# Patient Record
Sex: MALE | Race: WHITE | Employment: FULL TIME | URBAN - METROPOLITAN AREA
[De-identification: names, ages, dates, MRNs, and addresses within clinical notes are randomized per-mention and may not be internally consistent; named-entity substitution may affect disease eponyms.]

---

## 2018-07-29 ENCOUNTER — HOSPITAL ENCOUNTER (INPATIENT)
Age: 43
LOS: 5 days | Discharge: HOME | DRG: 101 | End: 2018-08-03
Attending: EMERGENCY MEDICINE
Payer: COMMERCIAL

## 2018-07-29 ENCOUNTER — APPOINTMENT (OUTPATIENT)
Dept: CT IMAGING | Age: 43
DRG: 101 | End: 2018-07-29
Payer: COMMERCIAL

## 2018-07-29 ENCOUNTER — APPOINTMENT (OUTPATIENT)
Dept: GENERAL RADIOLOGY | Age: 43
DRG: 101 | End: 2018-07-29
Payer: COMMERCIAL

## 2018-07-29 DIAGNOSIS — E87.6 HYPOKALEMIA: ICD-10-CM

## 2018-07-29 DIAGNOSIS — J01.00 ACUTE MAXILLARY SINUSITIS, RECURRENCE NOT SPECIFIED: ICD-10-CM

## 2018-07-29 DIAGNOSIS — D72.829 LEUKOCYTOSIS, UNSPECIFIED TYPE: ICD-10-CM

## 2018-07-29 DIAGNOSIS — R41.82 ALTERED MENTAL STATUS, UNSPECIFIED ALTERED MENTAL STATUS TYPE: Primary | ICD-10-CM

## 2018-07-29 DIAGNOSIS — R74.8 ELEVATED CPK: ICD-10-CM

## 2018-07-29 DIAGNOSIS — R56.9 SEIZURE (HCC): ICD-10-CM

## 2018-07-29 DIAGNOSIS — R52 PAIN: ICD-10-CM

## 2018-07-29 LAB
A/G RATIO: 1.3 (ref 1.1–2.2)
ALBUMIN SERPL-MCNC: 3.9 G/DL (ref 3.4–5)
ALBUMIN SERPL-MCNC: 3.9 G/DL (ref 3.4–5)
ALP BLD-CCNC: 109 U/L (ref 40–129)
ALP BLD-CCNC: 110 U/L (ref 40–129)
ALT SERPL-CCNC: 60 U/L (ref 10–40)
ALT SERPL-CCNC: 60 U/L (ref 10–40)
AMMONIA: 33 UMOL/L (ref 16–60)
AMORPHOUS: ABNORMAL /HPF
AMPHETAMINE SCREEN, URINE: NORMAL
ANION GAP SERPL CALCULATED.3IONS-SCNC: 12 MMOL/L (ref 3–16)
AST SERPL-CCNC: 164 U/L (ref 15–37)
AST SERPL-CCNC: 164 U/L (ref 15–37)
BARBITURATE SCREEN URINE: NORMAL
BASE EXCESS ARTERIAL: -1.3 MMOL/L (ref -3–3)
BASOPHILS ABSOLUTE: 0.2 K/UL (ref 0–0.2)
BASOPHILS RELATIVE PERCENT: 0.6 %
BENZODIAZEPINE SCREEN, URINE: NORMAL
BILIRUB SERPL-MCNC: 0.3 MG/DL (ref 0–1)
BILIRUB SERPL-MCNC: 0.3 MG/DL (ref 0–1)
BILIRUBIN DIRECT: <0.2 MG/DL (ref 0–0.3)
BILIRUBIN URINE: NEGATIVE
BILIRUBIN, INDIRECT: ABNORMAL MG/DL (ref 0–1)
BLOOD, URINE: ABNORMAL
BUN BLDV-MCNC: 19 MG/DL (ref 7–20)
CALCIUM IONIZED: 1.24 MMOL/L (ref 1.12–1.32)
CALCIUM SERPL-MCNC: 8.6 MG/DL (ref 8.3–10.6)
CANNABINOID SCREEN URINE: NORMAL
CARBOXYHEMOGLOBIN ARTERIAL: 3.1 % (ref 0–1.5)
CHLORIDE BLD-SCNC: 109 MMOL/L (ref 99–110)
CLARITY: ABNORMAL
CO2: 23 MMOL/L (ref 21–32)
CO2: 24 MMOL/L (ref 21–32)
COCAINE METABOLITE SCREEN URINE: NORMAL
COLOR: YELLOW
CREAT SERPL-MCNC: 1.1 MG/DL (ref 0.9–1.3)
CRYSTALS, UA: ABNORMAL /HPF
EOSINOPHILS ABSOLUTE: 0 K/UL (ref 0–0.6)
EOSINOPHILS RELATIVE PERCENT: 0 %
GFR AFRICAN AMERICAN: >60
GFR AFRICAN AMERICAN: >60
GFR NON-AFRICAN AMERICAN: >60
GFR NON-AFRICAN AMERICAN: >60
GLOBULIN: 3.1 G/DL
GLUCOSE BLD-MCNC: 104 MG/DL (ref 70–99)
GLUCOSE BLD-MCNC: 95 MG/DL (ref 70–99)
GLUCOSE URINE: NEGATIVE MG/DL
HCO3 ARTERIAL: 23 MMOL/L (ref 21–29)
HCT VFR BLD CALC: 46.1 % (ref 40.5–52.5)
HEMOGLOBIN, ART, EXTENDED: 14.6 G/DL
HEMOGLOBIN: 15.5 G/DL (ref 13.5–17.5)
KETONES, URINE: NEGATIVE MG/DL
LACTIC ACID: 1.4 MMOL/L (ref 0.4–2)
LEUKOCYTE ESTERASE, URINE: NEGATIVE
LYMPHOCYTES ABSOLUTE: 0.5 K/UL (ref 1–5.1)
LYMPHOCYTES RELATIVE PERCENT: 1.6 %
Lab: NORMAL
MCH RBC QN AUTO: 31.2 PG (ref 26–34)
MCHC RBC AUTO-ENTMCNC: 33.6 G/DL (ref 31–36)
MCV RBC AUTO: 92.9 FL (ref 80–100)
METHADONE SCREEN, URINE: NORMAL
METHEMOGLOBIN ARTERIAL: 0.8 % (ref 0–1.4)
MICROSCOPIC EXAMINATION: YES
MONOCYTES ABSOLUTE: 2.3 K/UL (ref 0–1.3)
MONOCYTES RELATIVE PERCENT: 8.1 %
NEUTROPHILS ABSOLUTE: 25.5 K/UL (ref 1.7–7.7)
NEUTROPHILS RELATIVE PERCENT: 89.7 %
NITRITE, URINE: NEGATIVE
O2 SAT, ARTERIAL: 98 % (ref 93–100)
OPIATE SCREEN URINE: NORMAL
OXYCODONE URINE: NORMAL
PCO2 ARTERIAL: 39.7 MMHG (ref 35–45)
PDW BLD-RTO: 15.5 % (ref 12.4–15.4)
PERFORMED ON: ABNORMAL
PH ARTERIAL: 7.38 (ref 7.35–7.45)
PH UA: 5.5
PH UA: 5.5
PHENCYCLIDINE SCREEN URINE: NORMAL
PHENYTOIN DOSE AMOUNT: ABNORMAL
PHENYTOIN LEVEL: 3.9 UG/ML (ref 10–20)
PLATELET # BLD: 264 K/UL (ref 135–450)
PMV BLD AUTO: 7.4 FL (ref 5–10.5)
PO2 ARTERIAL: 102 MMHG (ref 75–108)
POC ANION GAP: 15 (ref 10–20)
POC BUN: 22 MG/DL (ref 7–18)
POC CHLORIDE: 106 MMOL/L (ref 99–110)
POC CREATININE: 1.3 MG/DL (ref 0.9–1.3)
POC POTASSIUM: 3.1 MMOL/L (ref 3.5–5.1)
POC SAMPLE TYPE: ABNORMAL
POC SODIUM: 144 MMOL/L (ref 136–145)
POTASSIUM REFLEX MAGNESIUM: 4.6 MMOL/L (ref 3.5–5.1)
PROPOXYPHENE SCREEN: NORMAL
PROTEIN UA: 100 MG/DL
RBC # BLD: 4.96 M/UL (ref 4.2–5.9)
RBC UA: ABNORMAL /HPF (ref 0–2)
SODIUM BLD-SCNC: 145 MMOL/L (ref 136–145)
SPECIFIC GRAVITY UA: >=1.03
T4 TOTAL: 7.1 UG/DL (ref 4.5–10.9)
TCO2 ARTERIAL: 24 MMOL/L
TOTAL CK: 4420 U/L (ref 39–308)
TOTAL PROTEIN: 7 G/DL (ref 6.4–8.2)
TOTAL PROTEIN: 7 G/DL (ref 6.4–8.2)
TSH SERPL DL<=0.05 MIU/L-ACNC: 2.02 UIU/ML (ref 0.27–4.2)
URINE TYPE: ABNORMAL
UROBILINOGEN, URINE: 0.2 E.U./DL
WBC # BLD: 28.5 K/UL (ref 4–11)
WBC UA: ABNORMAL /HPF (ref 0–5)

## 2018-07-29 PROCEDURE — 70450 CT HEAD/BRAIN W/O DYE: CPT

## 2018-07-29 PROCEDURE — 6360000002 HC RX W HCPCS: Performed by: EMERGENCY MEDICINE

## 2018-07-29 PROCEDURE — 80307 DRUG TEST PRSMV CHEM ANLYZR: CPT

## 2018-07-29 PROCEDURE — 80047 BASIC METABLC PNL IONIZED CA: CPT

## 2018-07-29 PROCEDURE — 82550 ASSAY OF CK (CPK): CPT

## 2018-07-29 PROCEDURE — 72100 X-RAY EXAM L-S SPINE 2/3 VWS: CPT

## 2018-07-29 PROCEDURE — 2580000003 HC RX 258: Performed by: STUDENT IN AN ORGANIZED HEALTH CARE EDUCATION/TRAINING PROGRAM

## 2018-07-29 PROCEDURE — 83605 ASSAY OF LACTIC ACID: CPT

## 2018-07-29 PROCEDURE — 82803 BLOOD GASES ANY COMBINATION: CPT

## 2018-07-29 PROCEDURE — 94664 DEMO&/EVAL PT USE INHALER: CPT

## 2018-07-29 PROCEDURE — 2580000003 HC RX 258: Performed by: EMERGENCY MEDICINE

## 2018-07-29 PROCEDURE — 80053 COMPREHEN METABOLIC PANEL: CPT

## 2018-07-29 PROCEDURE — 93005 ELECTROCARDIOGRAM TRACING: CPT | Performed by: EMERGENCY MEDICINE

## 2018-07-29 PROCEDURE — 71045 X-RAY EXAM CHEST 1 VIEW: CPT

## 2018-07-29 PROCEDURE — 90715 TDAP VACCINE 7 YRS/> IM: CPT | Performed by: EMERGENCY MEDICINE

## 2018-07-29 PROCEDURE — 85025 COMPLETE CBC W/AUTO DIFF WBC: CPT

## 2018-07-29 PROCEDURE — 51702 INSERT TEMP BLADDER CATH: CPT

## 2018-07-29 PROCEDURE — 96365 THER/PROPH/DIAG IV INF INIT: CPT

## 2018-07-29 PROCEDURE — 80185 ASSAY OF PHENYTOIN TOTAL: CPT

## 2018-07-29 PROCEDURE — 87040 BLOOD CULTURE FOR BACTERIA: CPT

## 2018-07-29 PROCEDURE — 82140 ASSAY OF AMMONIA: CPT

## 2018-07-29 PROCEDURE — 6360000002 HC RX W HCPCS: Performed by: STUDENT IN AN ORGANIZED HEALTH CARE EDUCATION/TRAINING PROGRAM

## 2018-07-29 PROCEDURE — 90471 IMMUNIZATION ADMIN: CPT | Performed by: EMERGENCY MEDICINE

## 2018-07-29 PROCEDURE — 84443 ASSAY THYROID STIM HORMONE: CPT

## 2018-07-29 PROCEDURE — 81001 URINALYSIS AUTO W/SCOPE: CPT

## 2018-07-29 PROCEDURE — 36415 COLL VENOUS BLD VENIPUNCTURE: CPT

## 2018-07-29 PROCEDURE — 84436 ASSAY OF TOTAL THYROXINE: CPT

## 2018-07-29 PROCEDURE — 72125 CT NECK SPINE W/O DYE: CPT

## 2018-07-29 PROCEDURE — 1200000000 HC SEMI PRIVATE

## 2018-07-29 PROCEDURE — 6370000000 HC RX 637 (ALT 250 FOR IP): Performed by: STUDENT IN AN ORGANIZED HEALTH CARE EDUCATION/TRAINING PROGRAM

## 2018-07-29 PROCEDURE — 36600 WITHDRAWAL OF ARTERIAL BLOOD: CPT

## 2018-07-29 PROCEDURE — 99291 CRITICAL CARE FIRST HOUR: CPT

## 2018-07-29 RX ORDER — HEPARIN SODIUM 5000 [USP'U]/ML
5000 INJECTION, SOLUTION INTRAVENOUS; SUBCUTANEOUS EVERY 8 HOURS SCHEDULED
Status: DISCONTINUED | OUTPATIENT
Start: 2018-07-29 | End: 2018-08-03 | Stop reason: HOSPADM

## 2018-07-29 RX ORDER — PHENYTOIN SODIUM 100 MG/1
600 CAPSULE, EXTENDED RELEASE ORAL DAILY
Status: ON HOLD | COMMUNITY
End: 2018-08-03

## 2018-07-29 RX ORDER — POTASSIUM CHLORIDE 7.45 MG/ML
10 INJECTION INTRAVENOUS ONCE
Status: COMPLETED | OUTPATIENT
Start: 2018-07-29 | End: 2018-07-29

## 2018-07-29 RX ORDER — SODIUM CHLORIDE 0.9 % (FLUSH) 0.9 %
10 SYRINGE (ML) INJECTION EVERY 12 HOURS SCHEDULED
Status: DISCONTINUED | OUTPATIENT
Start: 2018-07-29 | End: 2018-08-03 | Stop reason: HOSPADM

## 2018-07-29 RX ORDER — NICOTINE 21 MG/24HR
1 PATCH, TRANSDERMAL 24 HOURS TRANSDERMAL DAILY
Status: DISCONTINUED | OUTPATIENT
Start: 2018-07-29 | End: 2018-07-30

## 2018-07-29 RX ORDER — FOSPHENYTOIN SODIUM 50 MG/ML
200 INJECTION, SOLUTION INTRAMUSCULAR; INTRAVENOUS EVERY 8 HOURS
Status: DISCONTINUED | OUTPATIENT
Start: 2018-07-29 | End: 2018-07-30

## 2018-07-29 RX ORDER — PHENYTOIN SODIUM 100 MG/1
600 CAPSULE, EXTENDED RELEASE ORAL DAILY
Status: DISCONTINUED | OUTPATIENT
Start: 2018-07-29 | End: 2018-07-29

## 2018-07-29 RX ORDER — ONDANSETRON 2 MG/ML
4 INJECTION INTRAMUSCULAR; INTRAVENOUS EVERY 6 HOURS PRN
Status: DISCONTINUED | OUTPATIENT
Start: 2018-07-29 | End: 2018-08-03 | Stop reason: HOSPADM

## 2018-07-29 RX ORDER — POTASSIUM CHLORIDE 1.5 G/1.77G
40 POWDER, FOR SOLUTION ORAL ONCE
Status: DISCONTINUED | OUTPATIENT
Start: 2018-07-29 | End: 2018-07-29

## 2018-07-29 RX ORDER — SODIUM CHLORIDE 0.9 % (FLUSH) 0.9 %
10 SYRINGE (ML) INJECTION PRN
Status: DISCONTINUED | OUTPATIENT
Start: 2018-07-29 | End: 2018-08-03 | Stop reason: HOSPADM

## 2018-07-29 RX ORDER — PHENYTOIN 50 MG/1
100 TABLET, CHEWABLE ORAL 3 TIMES DAILY
COMMUNITY
End: 2018-07-29 | Stop reason: CLARIF

## 2018-07-29 RX ORDER — SODIUM CHLORIDE 9 MG/ML
INJECTION, SOLUTION INTRAVENOUS CONTINUOUS
Status: DISCONTINUED | OUTPATIENT
Start: 2018-07-29 | End: 2018-08-01

## 2018-07-29 RX ORDER — PHENYTOIN SODIUM 50 MG/ML
200 INJECTION, SOLUTION INTRAMUSCULAR; INTRAVENOUS EVERY 8 HOURS
Status: DISCONTINUED | OUTPATIENT
Start: 2018-07-29 | End: 2018-07-29

## 2018-07-29 RX ORDER — SODIUM CHLORIDE, SODIUM LACTATE, POTASSIUM CHLORIDE, AND CALCIUM CHLORIDE .6; .31; .03; .02 G/100ML; G/100ML; G/100ML; G/100ML
1000 INJECTION, SOLUTION INTRAVENOUS ONCE
Status: COMPLETED | OUTPATIENT
Start: 2018-07-29 | End: 2018-07-29

## 2018-07-29 RX ADMIN — HEPARIN SODIUM 5000 UNITS: 5000 INJECTION INTRAVENOUS; SUBCUTANEOUS at 15:03

## 2018-07-29 RX ADMIN — FOSPHENYTOIN SODIUM 200 MG PE: 50 INJECTION, SOLUTION INTRAMUSCULAR; INTRAVENOUS at 18:50

## 2018-07-29 RX ADMIN — SODIUM CHLORIDE, POTASSIUM CHLORIDE, SODIUM LACTATE AND CALCIUM CHLORIDE 1000 ML: 600; 310; 30; 20 INJECTION, SOLUTION INTRAVENOUS at 10:34

## 2018-07-29 RX ADMIN — SODIUM CHLORIDE: 9 INJECTION, SOLUTION INTRAVENOUS at 21:07

## 2018-07-29 RX ADMIN — POTASSIUM CHLORIDE 10 MEQ: 10 INJECTION, SOLUTION INTRAVENOUS at 09:53

## 2018-07-29 RX ADMIN — TETANUS TOXOID, REDUCED DIPHTHERIA TOXOID AND ACELLULAR PERTUSSIS VACCINE, ADSORBED 0.5 ML: 5; 2.5; 8; 8; 2.5 SUSPENSION INTRAMUSCULAR at 09:34

## 2018-07-29 RX ADMIN — DEXTROSE MONOHYDRATE 750 MG PE: 50 INJECTION, SOLUTION INTRAVENOUS at 10:24

## 2018-07-29 RX ADMIN — SODIUM CHLORIDE: 9 INJECTION, SOLUTION INTRAVENOUS at 15:02

## 2018-07-29 ASSESSMENT — PAIN SCALES - GENERAL
PAINLEVEL_OUTOF10: 0

## 2018-07-29 NOTE — PROGRESS NOTES
4 Eyes Admission Assessment     I agree as the admission nurse that 2 RN's have performed a thorough Head to Toe Skin Assessment on the patient. ALL assessment sites listed below have been assessed on admission. Areas assessed by both nurses:   [x]   Head, Face, and Ears   [x]   Shoulders, Back, and Chest  [x]   Arms, Elbows, and Hands   [x]   Coccyx, Sacrum, and Ischum  [x]   Legs, Feet, and Heels        Does the Patient have Skin Breakdown? No         Bobo Prevention initiated:  Yes  Wound Care Orders initiated:  No      WOC nurse consulted for Pressure Injury (Stage 3,4, Unstageable, DTI, NWPT, and Complex wounds):  No      Nurse 1 eSignature: Electronically signed by Elsy Huerta RN on 7/29/18 at 4:13 PM    **SHARE this note so that the co-signing nurse is able to place an eSignature**    Nurse 2 eSignature: {Esignature:[id number]}      Bruising and abrasions noted on pt.  Face

## 2018-07-29 NOTE — ED PROVIDER NOTES
[address] [facility]          ATTENDING PHYSICIAN NOTE       Date of evaluation: 7/29/2018    Chief Complaint     Altered Mental Status (EMS called for Seizures; found pt unresponsive snoring respirations; family states pt has hx of siezures)      History of Present Illness     Paresh Gonzalez is a 38 y.o. male who presents After a possible seizure. This morning his family found him face first on the floor. He's had a history of seizure disorder and apparently is on Dilantin. When he arrived here patient was confused and only provided me with minimal history. Difficult to tell if he is taking his Dilantin. Complains of low back pain but no other pain. Does have abrasions about his face. Review of Systems     Review of Systems   Unable to perform ROS: Mental status change       Past Medical, Surgical, Family, and Social History     He has a past medical history of Seizures ([address] He has no past surgical history on file. His family history is not on file. He reports that he has been smoking. He has been smoking about 0.50 packs per day. He has quit using smokeless tobacco. He reports that he does not drink alcohol. Medications     Current Discharge Medication List      CONTINUE these medications which have NOT CHANGED    Details   phenytoin (DILANTIN) 100 MG ER capsule Take 600 mg by mouth daily             Allergies     He has No Known Allergies. Physical Exam     INITIAL VITALS: BP: 127/77, Temp: 98.4 °F (36.9 °C), Pulse: 80, Resp: 20, SpO2: 92 %   Physical Exam   Constitutional: He appears well-developed and well-nourished. No distress. HENT:   Head: Normocephalic. Right Ear: External ear normal.   Left Ear: External ear normal.   Abrasion submental area and then right forehead   Eyes: Conjunctivae and EOM are normal. Pupils are equal, round, and reactive to light. Neck:   No obvious deviation or tenderness   Cardiovascular: Normal rate and regular rhythm. Pulmonary/Chest: Breath sounds normal. He is in respiratory distress. Abdominal: Soft. He exhibits no distension. Musculoskeletal: Normal range of motion. Neurological:   Patient drowsy and appears to be postictal.  No focal neurologic deficits and moving all extremities. Does have a small laceration right side of his tongue. He is able to give me his name and age but does not know the date and does not know he is at [facility].   Skin: Skin is warm and dry. No rash noted. He is not diaphoretic. No erythema. No pallor. Diagnostic Results     EKG   Normal sinus rhythm with no ST or T-wave abnormalities normal conduction times and normal axis. RADIOLOGY:  XR CHEST PORTABLE   Final Result      No consolidation      CT Cervical Spine WO Contrast   Final Result      No acute bony abnormality of the cervical spine. CT Head WO Contrast   Final Result      No acute hemorrhage      Left maxillary sinusitis. XR LUMBAR SPINE (2-3 VIEWS)   Final Result      Limited evaluation of L1 on the lateral view.       No fracture      MRI BRAIN W WO CONTRAST    (Results Pending)       LABS:   Results for orders placed or performed during the hospital encounter of 07/29/18   Phenytoin level, total   Result Value Ref Range    Phenytoin Lvl 3.9 (L) 10.0 - 20.0 ug/mL    Phenytoin Dose Amount Unknown    CBC auto differential   Result Value Ref Range    WBC 28.5 (H) 4.0 - 11.0 K/uL    RBC 4.96 4.20 - 5.90 M/uL    Hemoglobin 15.5 13.5 - 17.5 g/dL    Hematocrit 46.1 40.5 - 52.5 %    MCV 92.9 80.0 - 100.0 fL    MCH 31.2 26.0 - 34.0 pg    MCHC 33.6 31.0 - 36.0 g/dL    RDW 15.5 (H) 12.4 - 15.4 %    Platelets 378 065 - 303 K/uL    MPV 7.4 5.0 - 10.5 fL    Neutrophils % 89.7 %    Lymphocytes % 1.6 %    Monocytes % 8.1 %    Eosinophils % 0.0 %    Basophils % 0.6 %    Neutrophils # 25.5 (H) 1.7 - 7.7 K/uL    Lymphocytes # 0.5 (L) 1.0 - 5.1 K/uL    Monocytes # 2.3 (H) 0.0 - 1.3 K/uL    Eosinophils # 0.0 0.0 - 0.6 K/uL were negative. Lumbar films negative. Patient did have an elevated white count at 28.5. Elevated CPK in the 4000 range. BUN elevated at 22 and normal creatinine. Dilantin level is only 3.9 therefore given 750 mg IV fosphenytoin. Urine drug screen was negative. According to his father he heard a thud during the night but did not check it out and then this morning found his son at the bottom of the stairs face first.    Critical Care:  Due to the immediate potential for life-threatening deterioration due to Altered mental status and seizure, I spent 33 minutes providing critical care. This time excludes time spent performing procedures but includes time spent on direct patient care, history retrieval, review of the chart, and discussions with patient, family, and consultant(s). Clinical Impression     1. Altered mental status, unspecified altered mental status type    2. Seizure ([address]    3. Leukocytosis, unspecified type    4. Elevated CPK    5. Hypokalemia        Disposition     PATIENT REFERRED TO:  No follow-up provider specified.     DISCHARGE MEDICATIONS:  Current Discharge Medication List          DISPOSITION  Patient admitted to the hospitalist.       Cm Cueva MD  07/29/18 7073

## 2018-07-29 NOTE — PROGRESS NOTES
PAtient has arrived to 5 tower, pt. Is only responds to verbal stimuli GCS of 13. Sanchez is still in place per Dr. Elizabeth Simms. Dad is at the bedside. Pt. Is currently laying on his side and occasionally grits his teeth together. RN will continue to assess.

## 2018-07-30 ENCOUNTER — APPOINTMENT (OUTPATIENT)
Dept: MRI IMAGING | Age: 43
DRG: 101 | End: 2018-07-30
Payer: COMMERCIAL

## 2018-07-30 LAB
ALBUMIN SERPL-MCNC: 4 G/DL (ref 3.4–5)
ANION GAP SERPL CALCULATED.3IONS-SCNC: 11 MMOL/L (ref 3–16)
ANION GAP SERPL CALCULATED.3IONS-SCNC: 11 MMOL/L (ref 3–16)
BASOPHILS ABSOLUTE: 0 K/UL (ref 0–0.2)
BASOPHILS RELATIVE PERCENT: 0 %
BUN BLDV-MCNC: 14 MG/DL (ref 7–20)
BUN BLDV-MCNC: 9 MG/DL (ref 7–20)
CALCIUM SERPL-MCNC: 8.8 MG/DL (ref 8.3–10.6)
CALCIUM SERPL-MCNC: 8.8 MG/DL (ref 8.3–10.6)
CHLORIDE BLD-SCNC: 105 MMOL/L (ref 99–110)
CHLORIDE BLD-SCNC: 108 MMOL/L (ref 99–110)
CO2: 23 MMOL/L (ref 21–32)
CO2: 27 MMOL/L (ref 21–32)
CREAT SERPL-MCNC: 0.8 MG/DL (ref 0.9–1.3)
CREAT SERPL-MCNC: 0.8 MG/DL (ref 0.9–1.3)
EOSINOPHILS ABSOLUTE: 0 K/UL (ref 0–0.6)
EOSINOPHILS RELATIVE PERCENT: 0.3 %
GFR AFRICAN AMERICAN: >60
GFR AFRICAN AMERICAN: >60
GFR NON-AFRICAN AMERICAN: >60
GFR NON-AFRICAN AMERICAN: >60
GLUCOSE BLD-MCNC: 94 MG/DL (ref 70–99)
GLUCOSE BLD-MCNC: 98 MG/DL (ref 70–99)
HCT VFR BLD CALC: 39.6 % (ref 40.5–52.5)
HEMOGLOBIN: 13.3 G/DL (ref 13.5–17.5)
LYMPHOCYTES ABSOLUTE: 2.2 K/UL (ref 1–5.1)
LYMPHOCYTES RELATIVE PERCENT: 14.7 %
MCH RBC QN AUTO: 31.3 PG (ref 26–34)
MCHC RBC AUTO-ENTMCNC: 33.6 G/DL (ref 31–36)
MCV RBC AUTO: 93 FL (ref 80–100)
MONOCYTES ABSOLUTE: 1.2 K/UL (ref 0–1.3)
MONOCYTES RELATIVE PERCENT: 8 %
NEUTROPHILS ABSOLUTE: 11.5 K/UL (ref 1.7–7.7)
NEUTROPHILS RELATIVE PERCENT: 77 %
PDW BLD-RTO: 15.4 % (ref 12.4–15.4)
PHENYTOIN DOSE AMOUNT: ABNORMAL
PHENYTOIN LEVEL: 21.6 UG/ML (ref 10–20)
PHOSPHORUS: 2.8 MG/DL (ref 2.5–4.9)
PLATELET # BLD: 216 K/UL (ref 135–450)
PMV BLD AUTO: 8 FL (ref 5–10.5)
POTASSIUM SERPL-SCNC: 3.5 MMOL/L (ref 3.5–5.1)
POTASSIUM SERPL-SCNC: 4 MMOL/L (ref 3.5–5.1)
RBC # BLD: 4.26 M/UL (ref 4.2–5.9)
SODIUM BLD-SCNC: 142 MMOL/L (ref 136–145)
SODIUM BLD-SCNC: 143 MMOL/L (ref 136–145)
TOTAL CK: ABNORMAL U/L (ref 39–308)
TOTAL CK: ABNORMAL U/L (ref 39–308)
WBC # BLD: 15 K/UL (ref 4–11)

## 2018-07-30 PROCEDURE — 1200000000 HC SEMI PRIVATE

## 2018-07-30 PROCEDURE — 6360000002 HC RX W HCPCS: Performed by: STUDENT IN AN ORGANIZED HEALTH CARE EDUCATION/TRAINING PROGRAM

## 2018-07-30 PROCEDURE — 2500000003 HC RX 250 WO HCPCS: Performed by: INTERNAL MEDICINE

## 2018-07-30 PROCEDURE — 95819 EEG AWAKE AND ASLEEP: CPT

## 2018-07-30 PROCEDURE — 36415 COLL VENOUS BLD VENIPUNCTURE: CPT

## 2018-07-30 PROCEDURE — 6370000000 HC RX 637 (ALT 250 FOR IP): Performed by: STUDENT IN AN ORGANIZED HEALTH CARE EDUCATION/TRAINING PROGRAM

## 2018-07-30 PROCEDURE — 82550 ASSAY OF CK (CPK): CPT

## 2018-07-30 PROCEDURE — 6370000000 HC RX 637 (ALT 250 FOR IP): Performed by: INTERNAL MEDICINE

## 2018-07-30 PROCEDURE — 2580000003 HC RX 258: Performed by: INTERNAL MEDICINE

## 2018-07-30 PROCEDURE — 6370000000 HC RX 637 (ALT 250 FOR IP): Performed by: NURSE PRACTITIONER

## 2018-07-30 PROCEDURE — 80185 ASSAY OF PHENYTOIN TOTAL: CPT

## 2018-07-30 PROCEDURE — 80069 RENAL FUNCTION PANEL: CPT

## 2018-07-30 PROCEDURE — 85025 COMPLETE CBC W/AUTO DIFF WBC: CPT

## 2018-07-30 PROCEDURE — 70553 MRI BRAIN STEM W/O & W/DYE: CPT

## 2018-07-30 PROCEDURE — 2580000003 HC RX 258: Performed by: STUDENT IN AN ORGANIZED HEALTH CARE EDUCATION/TRAINING PROGRAM

## 2018-07-30 RX ORDER — PHENYTOIN SODIUM 100 MG/1
600 CAPSULE, EXTENDED RELEASE ORAL DAILY
Status: DISCONTINUED | OUTPATIENT
Start: 2018-07-30 | End: 2018-08-03 | Stop reason: HOSPADM

## 2018-07-30 RX ORDER — AMOXICILLIN AND CLAVULANATE POTASSIUM 875; 125 MG/1; MG/1
1 TABLET, FILM COATED ORAL EVERY 12 HOURS SCHEDULED
Status: DISCONTINUED | OUTPATIENT
Start: 2018-07-30 | End: 2018-08-03 | Stop reason: HOSPADM

## 2018-07-30 RX ORDER — NICOTINE 21 MG/24HR
1 PATCH, TRANSDERMAL 24 HOURS TRANSDERMAL DAILY
Status: DISCONTINUED | OUTPATIENT
Start: 2018-07-30 | End: 2018-08-03 | Stop reason: HOSPADM

## 2018-07-30 RX ORDER — OXYCODONE HYDROCHLORIDE 5 MG/1
10 TABLET ORAL EVERY 4 HOURS PRN
Status: DISCONTINUED | OUTPATIENT
Start: 2018-07-30 | End: 2018-08-03 | Stop reason: HOSPADM

## 2018-07-30 RX ORDER — ACETAMINOPHEN 325 MG/1
650 TABLET ORAL EVERY 6 HOURS PRN
Status: DISCONTINUED | OUTPATIENT
Start: 2018-07-30 | End: 2018-08-03 | Stop reason: HOSPADM

## 2018-07-30 RX ORDER — 0.9 % SODIUM CHLORIDE 0.9 %
1000 INTRAVENOUS SOLUTION INTRAVENOUS ONCE
Status: COMPLETED | OUTPATIENT
Start: 2018-07-30 | End: 2018-07-30

## 2018-07-30 RX ORDER — LEVETIRACETAM 500 MG/1
500 TABLET ORAL 2 TIMES DAILY
Status: DISCONTINUED | OUTPATIENT
Start: 2018-07-30 | End: 2018-08-03 | Stop reason: HOSPADM

## 2018-07-30 RX ORDER — OXYCODONE HYDROCHLORIDE 5 MG/1
5 TABLET ORAL EVERY 4 HOURS PRN
Status: DISCONTINUED | OUTPATIENT
Start: 2018-07-30 | End: 2018-08-03 | Stop reason: HOSPADM

## 2018-07-30 RX ORDER — ACETAMINOPHEN 325 MG/1
650 TABLET ORAL EVERY 4 HOURS PRN
Status: DISCONTINUED | OUTPATIENT
Start: 2018-07-30 | End: 2018-07-30

## 2018-07-30 RX ADMIN — FOSPHENYTOIN SODIUM 200 MG PE: 50 INJECTION, SOLUTION INTRAMUSCULAR; INTRAVENOUS at 03:49

## 2018-07-30 RX ADMIN — Medication 10 ML: at 08:34

## 2018-07-30 RX ADMIN — SODIUM CHLORIDE: 9 INJECTION, SOLUTION INTRAVENOUS at 02:48

## 2018-07-30 RX ADMIN — AMOXICILLIN AND CLAVULANATE POTASSIUM 1 TABLET: 875; 125 TABLET, FILM COATED ORAL at 17:08

## 2018-07-30 RX ADMIN — PHENYTOIN SODIUM 600 MG: 100 CAPSULE ORAL at 17:09

## 2018-07-30 RX ADMIN — HEPARIN SODIUM 5000 UNITS: 5000 INJECTION INTRAVENOUS; SUBCUTANEOUS at 22:36

## 2018-07-30 RX ADMIN — FOSPHENYTOIN SODIUM 200 MG PE: 50 INJECTION, SOLUTION INTRAMUSCULAR; INTRAVENOUS at 12:18

## 2018-07-30 RX ADMIN — SODIUM CHLORIDE 1000 ML: 9 INJECTION, SOLUTION INTRAVENOUS at 11:58

## 2018-07-30 RX ADMIN — LEVETIRACETAM 500 MG: 500 TABLET ORAL at 20:33

## 2018-07-30 RX ADMIN — OXYCODONE HYDROCHLORIDE 10 MG: 5 TABLET ORAL at 09:47

## 2018-07-30 RX ADMIN — SODIUM CHLORIDE: 9 INJECTION, SOLUTION INTRAVENOUS at 08:34

## 2018-07-30 RX ADMIN — ACETAMINOPHEN 650 MG: 325 TABLET, FILM COATED ORAL at 04:26

## 2018-07-30 RX ADMIN — SODIUM BICARBONATE: 84 INJECTION, SOLUTION INTRAVENOUS at 14:02

## 2018-07-30 RX ADMIN — HEPARIN SODIUM 5000 UNITS: 5000 INJECTION INTRAVENOUS; SUBCUTANEOUS at 06:53

## 2018-07-30 RX ADMIN — OXYCODONE HYDROCHLORIDE 5 MG: 5 TABLET ORAL at 17:17

## 2018-07-30 RX ADMIN — HEPARIN SODIUM 5000 UNITS: 5000 INJECTION INTRAVENOUS; SUBCUTANEOUS at 14:03

## 2018-07-30 RX ADMIN — SODIUM CHLORIDE: 9 INJECTION, SOLUTION INTRAVENOUS at 22:36

## 2018-07-30 ASSESSMENT — PAIN DESCRIPTION - FREQUENCY
FREQUENCY: CONTINUOUS

## 2018-07-30 ASSESSMENT — PAIN DESCRIPTION - LOCATION
LOCATION: FACE
LOCATION: GENERALIZED

## 2018-07-30 ASSESSMENT — PAIN SCALES - GENERAL
PAINLEVEL_OUTOF10: 7
PAINLEVEL_OUTOF10: 5
PAINLEVEL_OUTOF10: 5
PAINLEVEL_OUTOF10: 8
PAINLEVEL_OUTOF10: 5
PAINLEVEL_OUTOF10: 5
PAINLEVEL_OUTOF10: 6

## 2018-07-30 ASSESSMENT — PAIN DESCRIPTION - PROGRESSION
CLINICAL_PROGRESSION: NOT CHANGED
CLINICAL_PROGRESSION: GRADUALLY WORSENING
CLINICAL_PROGRESSION: GRADUALLY WORSENING

## 2018-07-30 ASSESSMENT — PAIN DESCRIPTION - DESCRIPTORS
DESCRIPTORS: ACHING;CONSTANT;SORE
DESCRIPTORS: ACHING;CONSTANT
DESCRIPTORS: ACHING;CONSTANT;SORE
DESCRIPTORS: ACHING
DESCRIPTORS: ACHING;CONSTANT
DESCRIPTORS: ACHING;SORE

## 2018-07-30 ASSESSMENT — PAIN DESCRIPTION - PAIN TYPE
TYPE: ACUTE PAIN

## 2018-07-30 ASSESSMENT — PAIN DESCRIPTION - ONSET
ONSET: ON-GOING

## 2018-07-30 ASSESSMENT — PAIN DESCRIPTION - ORIENTATION: ORIENTATION: MID
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
## 2018-07-30 NOTE — PROGRESS NOTES
Patient alert and oriented x4. Vital signs stable. Tolerating food and fluids well. Uses call light appropriately. Urinated several times today. Complains of generalized pain, tolerating oral pain medication. Patient has been anxious, wanting to leave as soon as possible. Will continue to monitor.

## 2018-07-30 NOTE — PROGRESS NOTES
Patient responsive to verbal stimuli, remains drowsy and confused, vitals stable, fall precautions in place, will continue to monitor
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
## 2018-07-30 NOTE — PLAN OF CARE
Problem: Falls - Risk of:  Goal: Will remain free from falls  Will remain free from falls   Pt free from injury or falls at this time, fall precautions in place, bed in low position, side rail up x2, Rico Fall Risk: High (45 and higher), bed alarm on, reoriented to room and call light, reminded not to get up without assistance, call light in reach, will continue to monitor. Pt verbalizes understanding of fall risk procedures. Problem: Risk for Impaired Skin Integrity  Goal: Tissue integrity - skin and mucous membranes  Structural intactness and normal physiological function of skin and  mucous membranes.    Skin intact except for facial abrasions, patient able to turn in bed
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
## 2018-07-30 NOTE — PROGRESS NOTES
Patient seen and examined  Has complaint of generalized pain  Concern for seizure and compliance w seizure medications  Will increase amount of fluid he is getting and alkalnize urine w bicarbonate. Pain medications added  Anticipate discharge in am if condition improves monitor creatinine and close monitoring of urine output    Full note to follow.

## 2018-07-31 LAB
AMPHETAMINES SCREEN BLOOD: NEGATIVE NG/ML
BARBITURATES SCREEN BLOOD: NEGATIVE NG/ML
BASOPHILS ABSOLUTE: 0 K/UL (ref 0–0.2)
BASOPHILS RELATIVE PERCENT: 0.2 %
BENZODIAZEPINES SCREEN BLOOD: NEGATIVE NG/ML
BUPRENORPHINE: NEGATIVE NG/ML
CANNABINOID SCREEN BLOOD: NEGATIVE NG/ML
COCAINE SCREEN BLOOD: NEGATIVE NG/ML
EOSINOPHILS ABSOLUTE: 0.1 K/UL (ref 0–0.6)
EOSINOPHILS RELATIVE PERCENT: 1.5 %
HCT VFR BLD CALC: 30.3 % (ref 40.5–52.5)
HEMOGLOBIN: 10.2 G/DL (ref 13.5–17.5)
LYMPHOCYTES ABSOLUTE: 1.5 K/UL (ref 1–5.1)
LYMPHOCYTES RELATIVE PERCENT: 20.3 %
Lab: NORMAL
MCH RBC QN AUTO: 31.5 PG (ref 26–34)
MCHC RBC AUTO-ENTMCNC: 33.7 G/DL (ref 31–36)
MCV RBC AUTO: 93.5 FL (ref 80–100)
METHADONE SCREEN BLOOD: NEGATIVE NG/ML
METHAMPHETAMINES SERUM/ PLASMA: NEGATIVE NG/ML
MONOCYTES ABSOLUTE: 0.7 K/UL (ref 0–1.3)
MONOCYTES RELATIVE PERCENT: 9.7 %
NEUTROPHILS ABSOLUTE: 4.9 K/UL (ref 1.7–7.7)
NEUTROPHILS RELATIVE PERCENT: 68.3 %
OPIATES SCREEN BLOOD: NEGATIVE NG/ML
OXYCODONE: NEGATIVE NG/ML
PDW BLD-RTO: 15.4 % (ref 12.4–15.4)
PHENCYCLIDINE SCREEN BLOOD: NEGATIVE NG/ML
PLATELET # BLD: 171 K/UL (ref 135–450)
PMV BLD AUTO: 7.8 FL (ref 5–10.5)
RBC # BLD: 3.24 M/UL (ref 4.2–5.9)
TOTAL CK: ABNORMAL U/L (ref 39–308)
TOTAL CK: ABNORMAL U/L (ref 39–308)
WBC # BLD: 7.2 K/UL (ref 4–11)

## 2018-07-31 PROCEDURE — G8988 SELF CARE GOAL STATUS: HCPCS

## 2018-07-31 PROCEDURE — 97116 GAIT TRAINING THERAPY: CPT

## 2018-07-31 PROCEDURE — 97165 OT EVAL LOW COMPLEX 30 MIN: CPT

## 2018-07-31 PROCEDURE — 2580000003 HC RX 258: Performed by: STUDENT IN AN ORGANIZED HEALTH CARE EDUCATION/TRAINING PROGRAM

## 2018-07-31 PROCEDURE — 85025 COMPLETE CBC W/AUTO DIFF WBC: CPT

## 2018-07-31 PROCEDURE — 6360000002 HC RX W HCPCS: Performed by: STUDENT IN AN ORGANIZED HEALTH CARE EDUCATION/TRAINING PROGRAM

## 2018-07-31 PROCEDURE — 97535 SELF CARE MNGMENT TRAINING: CPT

## 2018-07-31 PROCEDURE — G8979 MOBILITY GOAL STATUS: HCPCS

## 2018-07-31 PROCEDURE — G8987 SELF CARE CURRENT STATUS: HCPCS

## 2018-07-31 PROCEDURE — 6370000000 HC RX 637 (ALT 250 FOR IP): Performed by: STUDENT IN AN ORGANIZED HEALTH CARE EDUCATION/TRAINING PROGRAM

## 2018-07-31 PROCEDURE — 6370000000 HC RX 637 (ALT 250 FOR IP): Performed by: NURSE PRACTITIONER

## 2018-07-31 PROCEDURE — 6370000000 HC RX 637 (ALT 250 FOR IP): Performed by: INTERNAL MEDICINE

## 2018-07-31 PROCEDURE — 36415 COLL VENOUS BLD VENIPUNCTURE: CPT

## 2018-07-31 PROCEDURE — 82550 ASSAY OF CK (CPK): CPT

## 2018-07-31 PROCEDURE — G8978 MOBILITY CURRENT STATUS: HCPCS

## 2018-07-31 PROCEDURE — 97530 THERAPEUTIC ACTIVITIES: CPT

## 2018-07-31 PROCEDURE — 97162 PT EVAL MOD COMPLEX 30 MIN: CPT

## 2018-07-31 PROCEDURE — 1200000000 HC SEMI PRIVATE

## 2018-07-31 RX ORDER — LORAZEPAM 0.5 MG/1
0.5 TABLET ORAL EVERY 8 HOURS
Status: DISPENSED | OUTPATIENT
Start: 2018-07-31 | End: 2018-08-01

## 2018-07-31 RX ORDER — LORAZEPAM 0.5 MG/1
0.5 TABLET ORAL 2 TIMES DAILY
Status: DISCONTINUED | OUTPATIENT
Start: 2018-08-01 | End: 2018-08-01

## 2018-07-31 RX ORDER — LORAZEPAM 0.5 MG/1
0.5 TABLET ORAL ONCE
Status: DISCONTINUED | OUTPATIENT
Start: 2018-08-02 | End: 2018-08-03 | Stop reason: HOSPADM

## 2018-07-31 RX ADMIN — SODIUM CHLORIDE: 9 INJECTION, SOLUTION INTRAVENOUS at 04:32

## 2018-07-31 RX ADMIN — AMOXICILLIN AND CLAVULANATE POTASSIUM 1 TABLET: 875; 125 TABLET, FILM COATED ORAL at 20:35

## 2018-07-31 RX ADMIN — AMOXICILLIN AND CLAVULANATE POTASSIUM 1 TABLET: 875; 125 TABLET, FILM COATED ORAL at 09:24

## 2018-07-31 RX ADMIN — SODIUM CHLORIDE: 9 INJECTION, SOLUTION INTRAVENOUS at 16:11

## 2018-07-31 RX ADMIN — Medication 10 ML: at 09:32

## 2018-07-31 RX ADMIN — OXYCODONE HYDROCHLORIDE 5 MG: 5 TABLET ORAL at 03:42

## 2018-07-31 RX ADMIN — SODIUM CHLORIDE: 9 INJECTION, SOLUTION INTRAVENOUS at 10:19

## 2018-07-31 RX ADMIN — LEVETIRACETAM 500 MG: 500 TABLET ORAL at 20:35

## 2018-07-31 RX ADMIN — OXYCODONE HYDROCHLORIDE 5 MG: 5 TABLET ORAL at 14:57

## 2018-07-31 RX ADMIN — LORAZEPAM 0.5 MG: 0.5 TABLET ORAL at 09:28

## 2018-07-31 RX ADMIN — HEPARIN SODIUM 5000 UNITS: 5000 INJECTION INTRAVENOUS; SUBCUTANEOUS at 22:19

## 2018-07-31 RX ADMIN — LEVETIRACETAM 500 MG: 500 TABLET ORAL at 09:24

## 2018-07-31 RX ADMIN — HEPARIN SODIUM 5000 UNITS: 5000 INJECTION INTRAVENOUS; SUBCUTANEOUS at 06:06

## 2018-07-31 RX ADMIN — OXYCODONE HYDROCHLORIDE 10 MG: 5 TABLET ORAL at 20:35

## 2018-07-31 RX ADMIN — Medication 10 ML: at 20:35

## 2018-07-31 RX ADMIN — LORAZEPAM 0.5 MG: 0.5 TABLET ORAL at 16:40

## 2018-07-31 RX ADMIN — HEPARIN SODIUM 5000 UNITS: 5000 INJECTION INTRAVENOUS; SUBCUTANEOUS at 14:47

## 2018-07-31 RX ADMIN — PHENYTOIN SODIUM 600 MG: 100 CAPSULE ORAL at 09:41

## 2018-07-31 ASSESSMENT — PAIN SCALES - GENERAL
PAINLEVEL_OUTOF10: 0
PAINLEVEL_OUTOF10: 3
PAINLEVEL_OUTOF10: 0
PAINLEVEL_OUTOF10: 3
PAINLEVEL_OUTOF10: 6
PAINLEVEL_OUTOF10: 6
PAINLEVEL_OUTOF10: 5
PAINLEVEL_OUTOF10: 8

## 2018-07-31 ASSESSMENT — PAIN DESCRIPTION - PAIN TYPE
TYPE: ACUTE PAIN

## 2018-07-31 ASSESSMENT — PAIN DESCRIPTION - ONSET
ONSET: ON-GOING

## 2018-07-31 ASSESSMENT — PAIN DESCRIPTION - PROGRESSION
CLINICAL_PROGRESSION: GRADUALLY WORSENING
CLINICAL_PROGRESSION: GRADUALLY WORSENING
CLINICAL_PROGRESSION: NOT CHANGED
CLINICAL_PROGRESSION: NOT CHANGED

## 2018-07-31 ASSESSMENT — PAIN DESCRIPTION - FREQUENCY
FREQUENCY: CONTINUOUS
FREQUENCY: CONTINUOUS

## 2018-07-31 ASSESSMENT — PAIN DESCRIPTION - DESCRIPTORS
DESCRIPTORS: ACHING

## 2018-07-31 ASSESSMENT — PAIN DESCRIPTION - LOCATION
LOCATION: GENERALIZED

## 2018-07-31 NOTE — PROGRESS NOTES
path with head turning,   Distance: 300'  Comments: Verbal cues for safety and sequencing. Patient slightly impulsive with mobility, requires CGA-Mayela for balance. Stairs/Curb  Stairs?: Yes  Stairs  # Steps : 2  Rails: Bilateral  Assistance: Contact guard assistance  Comment: Verbal cues for safety and sequencing     Balance  Sitting - Static: Fair  Sitting - Dynamic: Fair  Standing - Static: Fair  Standing - Dynamic: Fair        Assessment   Body structures, Functions, Activity limitations: Decreased functional mobility ; Decreased balance;Decreased safe awareness  Assessment: Patient presents with decreased functional mobility and increased need for assistance as compared to patient reported baseline. Patient demonstrating impaired balance and requires CGA-Mayela for safety with gait. Patient needing cues for safety, slightly impulsive with mobility. Patient wanting to return home and return to work right at discharge. Patient educated on safety with mobility and current need for someone to assist patient 2/2 impaired balance. IF patient returns home, would benefit from 24 hour supervision and assistance. Will contine to assess and follow inpatient. Treatment Diagnosis: Decreased functional mobility  Prognosis: Good  Decision Making: Medium Complexity  Patient Education: Patient educated on role of PT, discharge planning, and safety with mobility. Patient would benefit from continued education.    REQUIRES PT FOLLOW UP: Yes  Activity Tolerance  Activity Tolerance: Patient Tolerated treatment well         Plan   Plan  Times per week: 2-5  Current Treatment Recommendations: Transfer Training, Strengthening, Endurance Training, ROM, ADL/Self-care Training, Balance Training, IADL Training, Gait Training, Functional Mobility Training, Stair training, Safety Education & Training, Equipment Evaluation, Education, & procurement, Patient/Caregiver Education & Training  Safety Devices  Type of devices: Call light within

## 2018-07-31 NOTE — PLAN OF CARE
Problem: Musculor/Skeletal Functional Status  Intervention: OT Evaluation/treatment  Pt will increase independence with functional transfers and ADLs.
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
## 2018-07-31 NOTE — PROGRESS NOTES
Patient alert and oriented x4. Vital signs stable. Tolerating food and fluids well. Uses call light appropriately. Urinated several times today. Walked in the halls multiple times and worked with PT/OT. Tolerating oral pain medication, mostly sore. Will continue to monitor.

## 2018-07-31 NOTE — PLAN OF CARE
Problem: Falls - Risk of:  Goal: Will remain free from falls  Will remain free from falls   Outcome: Ongoing  Patient is a high fall risk. All fall precautions in place: bed alarm on, nonskid socks on pt, call light within reach, bed locked in lowest position. Pt is alert and oriented and calls out appropriately. Will continue to monitor pt safety. Problem: Pain:  Goal: Pain level will decrease  Pain level will decrease   Outcome: Ongoing  Pt complains of generalized pain. PRN  Roxicodone given. Pt now sleeping with RR greater than 12. Will continue to moniotor.

## 2018-07-31 NOTE — PROGRESS NOTES
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  Leisure & Hobbies: go to [employer]; race cars  Additional Comments: pt reports falling down 10 stairs during seizure at home       Objective   Vision: Within Functional Limits  Hearing: Within functional limits    Orientation  Overall Orientation Status: Within Normal Limits     Balance  Sitting Balance: Supervision  Standing Balance: Contact guard assistance (-min A )  Standing Balance  Time: 10 min total  Activity: bathroom mobility, LB dressing, toileting, grooming, hallway mobility  Sit to stand: Contact guard assistance (eob, chair)  Stand to sit: Contact guard assistance (eob, chair)  Functional Mobility  Functional - Mobility Device: No device  Activity: To/from bathroom; Other (hallway 400')  Assist Level: Minimal assistance (-cga)  Functional Mobility Comments: pt primarily cga during mobility but pt had 3 LOB during mobility needing min A to correct; pt impulsive, demonstrates decreased insight/awareness to his current balance deficits     ADL  Feeding: Independent  Grooming: Independent (hand hygiene, oral care)  LE Dressing: Contact guard assistance (balance assist when pulling up jeans; donned shoes and tied with increased time 2* LE soreness)  Toileting: Contact guard assistance (stance at commode to urinate)        Bed mobility  Supine to Sit: Stand by assistance (HOB elevated)     Transfers  Sit to stand: Contact guard assistance (eob, chair)  Stand to sit: Contact guard assistance (eob, chair)  Transfer Comments: impulsive during transfers, needs cues for pacing to increase safety     Cognition  Overall G-Code Modifier : CJ    Goals  Short term goals  Time Frame for Short term goals: by discharge  Short term goal 1: supervision for ADL item retrieval and transport during ADLs/mobility  Short term goal 2: mod I  toileting  Short term goal 3: mod I standing balance during grooming tasks  Short term goal 4: mod I LB dressing  Patient Goals   Patient goals : return home, return to work as soon as able       Therapy Time   Individual Concurrent Group Co-treatment   Time In 1155         Time Out 1233         Minutes 38         Timed Code Treatment Minutes:   23  Total Treatment Minutes:  45     If patient is d/c prior to next treatment session, this note will serve as the discharge summary    Tipser OTR/L, 2650

## 2018-07-31 NOTE — PROGRESS NOTES
Pt is alert and oriented times 4. VSS. Pt complains of generalized pain, PRN Roxicodone given. Pt voids per urinal and has had adequate out put this shift. Pt is tolerating general diet and denies n/v. All fall precautions in place. Will continue to monitor.

## 2018-08-01 LAB
ALT SERPL-CCNC: 167 U/L (ref 10–40)
ANION GAP SERPL CALCULATED.3IONS-SCNC: 8 MMOL/L (ref 3–16)
AST SERPL-CCNC: 450 U/L (ref 15–37)
BASOPHILS ABSOLUTE: 0 K/UL (ref 0–0.2)
BASOPHILS RELATIVE PERCENT: 0.3 %
BUN BLDV-MCNC: 8 MG/DL (ref 7–20)
C-REACTIVE PROTEIN: 31.1 MG/L (ref 0–5.1)
CALCIUM SERPL-MCNC: 9.2 MG/DL (ref 8.3–10.6)
CHLORIDE BLD-SCNC: 108 MMOL/L (ref 99–110)
CO2: 27 MMOL/L (ref 21–32)
CREAT SERPL-MCNC: 0.7 MG/DL (ref 0.9–1.3)
EOSINOPHILS ABSOLUTE: 0.2 K/UL (ref 0–0.6)
EOSINOPHILS RELATIVE PERCENT: 2.5 %
GFR AFRICAN AMERICAN: >60
GFR NON-AFRICAN AMERICAN: >60
GLUCOSE BLD-MCNC: 96 MG/DL (ref 70–99)
HCT VFR BLD CALC: 42.4 % (ref 40.5–52.5)
HEMOGLOBIN: 14.2 G/DL (ref 13.5–17.5)
LYMPHOCYTES ABSOLUTE: 2 K/UL (ref 1–5.1)
LYMPHOCYTES RELATIVE PERCENT: 27.5 %
MCH RBC QN AUTO: 31.5 PG (ref 26–34)
MCHC RBC AUTO-ENTMCNC: 33.5 G/DL (ref 31–36)
MCV RBC AUTO: 94.1 FL (ref 80–100)
MONOCYTES ABSOLUTE: 0.7 K/UL (ref 0–1.3)
MONOCYTES RELATIVE PERCENT: 10.3 %
NEUTROPHILS ABSOLUTE: 4.3 K/UL (ref 1.7–7.7)
NEUTROPHILS RELATIVE PERCENT: 59.4 %
PDW BLD-RTO: 15.5 % (ref 12.4–15.4)
PLATELET # BLD: 227 K/UL (ref 135–450)
PMV BLD AUTO: 7.7 FL (ref 5–10.5)
POTASSIUM SERPL-SCNC: 4.3 MMOL/L (ref 3.5–5.1)
RBC # BLD: 4.51 M/UL (ref 4.2–5.9)
SEDIMENTATION RATE, ERYTHROCYTE: 24 MM/HR (ref 0–15)
SODIUM BLD-SCNC: 143 MMOL/L (ref 136–145)
TOTAL CK: ABNORMAL U/L (ref 39–308)
WBC # BLD: 7.2 K/UL (ref 4–11)

## 2018-08-01 PROCEDURE — 85652 RBC SED RATE AUTOMATED: CPT

## 2018-08-01 PROCEDURE — 85025 COMPLETE CBC W/AUTO DIFF WBC: CPT

## 2018-08-01 PROCEDURE — 82550 ASSAY OF CK (CPK): CPT

## 2018-08-01 PROCEDURE — 2500000003 HC RX 250 WO HCPCS: Performed by: INTERNAL MEDICINE

## 2018-08-01 PROCEDURE — 6370000000 HC RX 637 (ALT 250 FOR IP): Performed by: STUDENT IN AN ORGANIZED HEALTH CARE EDUCATION/TRAINING PROGRAM

## 2018-08-01 PROCEDURE — 84460 ALANINE AMINO (ALT) (SGPT): CPT

## 2018-08-01 PROCEDURE — 84450 TRANSFERASE (AST) (SGOT): CPT

## 2018-08-01 PROCEDURE — 6360000002 HC RX W HCPCS: Performed by: STUDENT IN AN ORGANIZED HEALTH CARE EDUCATION/TRAINING PROGRAM

## 2018-08-01 PROCEDURE — 36415 COLL VENOUS BLD VENIPUNCTURE: CPT

## 2018-08-01 PROCEDURE — 6370000000 HC RX 637 (ALT 250 FOR IP): Performed by: INTERNAL MEDICINE

## 2018-08-01 PROCEDURE — 6370000000 HC RX 637 (ALT 250 FOR IP): Performed by: NURSE PRACTITIONER

## 2018-08-01 PROCEDURE — 2580000003 HC RX 258: Performed by: INTERNAL MEDICINE

## 2018-08-01 PROCEDURE — 83516 IMMUNOASSAY NONANTIBODY: CPT

## 2018-08-01 PROCEDURE — 2580000003 HC RX 258: Performed by: STUDENT IN AN ORGANIZED HEALTH CARE EDUCATION/TRAINING PROGRAM

## 2018-08-01 PROCEDURE — 1200000000 HC SEMI PRIVATE

## 2018-08-01 PROCEDURE — 80048 BASIC METABOLIC PNL TOTAL CA: CPT

## 2018-08-01 PROCEDURE — 86038 ANTINUCLEAR ANTIBODIES: CPT

## 2018-08-01 PROCEDURE — 86140 C-REACTIVE PROTEIN: CPT

## 2018-08-01 RX ORDER — LORAZEPAM 0.5 MG/1
0.5 TABLET ORAL EVERY 6 HOURS PRN
Status: DISCONTINUED | OUTPATIENT
Start: 2018-08-01 | End: 2018-08-03 | Stop reason: HOSPADM

## 2018-08-01 RX ORDER — 0.9 % SODIUM CHLORIDE 0.9 %
2000 INTRAVENOUS SOLUTION INTRAVENOUS ONCE
Status: COMPLETED | OUTPATIENT
Start: 2018-08-01 | End: 2018-08-01

## 2018-08-01 RX ORDER — BACITRACIN, NEOMYCIN, POLYMYXIN B 400; 3.5; 5 [USP'U]/G; MG/G; [USP'U]/G
OINTMENT TOPICAL 2 TIMES DAILY
Status: DISCONTINUED | OUTPATIENT
Start: 2018-08-01 | End: 2018-08-01

## 2018-08-01 RX ORDER — BACITRACIN, NEOMYCIN, POLYMYXIN B 400; 3.5; 5 [USP'U]/G; MG/G; [USP'U]/G
OINTMENT TOPICAL 2 TIMES DAILY
Status: DISCONTINUED | OUTPATIENT
Start: 2018-08-01 | End: 2018-08-03 | Stop reason: HOSPADM

## 2018-08-01 RX ADMIN — HEPARIN SODIUM 5000 UNITS: 5000 INJECTION INTRAVENOUS; SUBCUTANEOUS at 06:05

## 2018-08-01 RX ADMIN — OXYCODONE HYDROCHLORIDE 10 MG: 5 TABLET ORAL at 21:08

## 2018-08-01 RX ADMIN — PHENYTOIN SODIUM 600 MG: 100 CAPSULE ORAL at 07:58

## 2018-08-01 RX ADMIN — SODIUM BICARBONATE: 84 INJECTION, SOLUTION INTRAVENOUS at 13:58

## 2018-08-01 RX ADMIN — HEPARIN SODIUM 5000 UNITS: 5000 INJECTION INTRAVENOUS; SUBCUTANEOUS at 13:56

## 2018-08-01 RX ADMIN — SODIUM CHLORIDE: 9 INJECTION, SOLUTION INTRAVENOUS at 05:43

## 2018-08-01 RX ADMIN — AMOXICILLIN AND CLAVULANATE POTASSIUM 1 TABLET: 875; 125 TABLET, FILM COATED ORAL at 21:08

## 2018-08-01 RX ADMIN — LEVETIRACETAM 500 MG: 500 TABLET ORAL at 07:57

## 2018-08-01 RX ADMIN — BACITRACIN, NEOMYCIN, POLYMYXIN B: 400; 3.5; 5 OINTMENT TOPICAL at 08:00

## 2018-08-01 RX ADMIN — SODIUM CHLORIDE 2000 ML: 9 INJECTION, SOLUTION INTRAVENOUS at 10:28

## 2018-08-01 RX ADMIN — AMOXICILLIN AND CLAVULANATE POTASSIUM 1 TABLET: 875; 125 TABLET, FILM COATED ORAL at 07:58

## 2018-08-01 RX ADMIN — ACETAMINOPHEN 650 MG: 325 TABLET, FILM COATED ORAL at 06:06

## 2018-08-01 RX ADMIN — LEVETIRACETAM 500 MG: 500 TABLET ORAL at 21:08

## 2018-08-01 RX ADMIN — SODIUM BICARBONATE: 84 INJECTION, SOLUTION INTRAVENOUS at 23:46

## 2018-08-01 RX ADMIN — HEPARIN SODIUM 5000 UNITS: 5000 INJECTION INTRAVENOUS; SUBCUTANEOUS at 21:08

## 2018-08-01 RX ADMIN — Medication 10 ML: at 08:01

## 2018-08-01 ASSESSMENT — PAIN DESCRIPTION - FREQUENCY: FREQUENCY: CONTINUOUS

## 2018-08-01 ASSESSMENT — PAIN SCALES - GENERAL
PAINLEVEL_OUTOF10: 3
PAINLEVEL_OUTOF10: 0
PAINLEVEL_OUTOF10: 7

## 2018-08-01 ASSESSMENT — PAIN DESCRIPTION - LOCATION: LOCATION: GENERALIZED

## 2018-08-01 ASSESSMENT — PAIN DESCRIPTION - PAIN TYPE: TYPE: ACUTE PAIN

## 2018-08-01 ASSESSMENT — PAIN DESCRIPTION - DESCRIPTORS: DESCRIPTORS: ACHING

## 2018-08-01 ASSESSMENT — PAIN DESCRIPTION - ONSET: ONSET: ON-GOING

## 2018-08-01 ASSESSMENT — PAIN DESCRIPTION - PROGRESSION: CLINICAL_PROGRESSION: NOT CHANGED

## 2018-08-01 NOTE — PLAN OF CARE
Problem: Falls - Risk of:  Goal: Will remain free from falls  Will remain free from falls   Outcome: Ongoing  Patient remains free from falls this shift. Gait unsteady and patient impulsive at times during ambulation. Ambulating in room and hallways with gait belt and assist x1. Patient using call light appropriately when in need of assistance. In bed with call light within reach and bed alarm on. Will continue to monitor.

## 2018-08-01 NOTE — PROGRESS NOTES
Progress Note  PGY-1    Hospital Day: 4                                                         Code:Full Code  Admit Date: 7/29/2018                                 PCP: No primary care provider on file. Daily Plan:  8/1/2018    Subjective:   42 y.o. M p/w convulsive seizure.  Hx epilepsy (followed by  neurology).  Hx convulsive seizures occurring every few years (last at least 2 years ago), on phenytoin. O/N: RN paged asking for neosporin cream.     In the morning, pt was sitting comfortably in chair, eating breakfast. Denied chest pain, nausea, vomiting. MEDICATIONS:   Scheduled Meds:   LORazepam  0.5 mg Oral Q8H    LORazepam  0.5 mg Oral BID    [START ON 8/2/2018] LORazepam  0.5 mg Oral Once    phenytoin  600 mg Oral Daily    levETIRAcetam  500 mg Oral BID    amoxicillin-clavulanate  1 tablet Oral 2 times per day    nicotine  1 patch Transdermal Daily    sodium chloride flush  10 mL Intravenous 2 times per day    heparin (porcine)  5,000 Units Subcutaneous 3 times per day      Continuous Infusions:   IV infusion builder 150 mL/hr at 07/30/18 1402    sodium chloride 175 mL/hr at 07/31/18 1611     PRN Meds:acetaminophen, oxyCODONE **OR** oxyCODONE, sodium chloride flush, magnesium hydroxide, ondansetron    Allergies: No Known Allergies    PHYSICAL EXAM:       Vitals: BP (!) 143/84   Pulse 66   Temp 98.3 °F (36.8 °C) (Oral)   Resp 16   Wt 152 lb 5 oz (69.1 kg)   SpO2 99%     I/O:    Intake/Output Summary (Last 24 hours) at 08/01/18 0303  Last data filed at 08/01/18 0127   Gross per 24 hour   Intake              600 ml   Output             3450 ml   Net            -2850 ml     I/O this shift:  In: -   Out: 525 [Urine:525]  I/O last 3 completed shifts:   In: 600 [P.O.:600]  Out: 2925 [Urine:2925]    Physical Examination:   · General appearance: Appears comfortable at rest, fully alert and orientated    · HEENT: Has bruises and scars on his face post fall, otherwise normocephalic. PERRL, EOMI. Moist mucus membranes  · Respiratory: Normal respiratory effort. No wheezes, rubs, or crackles  · Cardiovascular: regular S1/S2, with no Murmur, rub or gallop. · Abdomen: Soft, non-tender, non-distended  · Musculoskeletal: No clubbing, cyanosis, no lower extremity edema, peripheral pulses present, cap refill < 2sec  · Neurologic: Neurovascularly grossly intact without any focal motor deficits. Cranial nerves:  grossly non-focal.      Recent Labs      07/29/18   1430   PHART  7.382   TDX9RMO  39.7   PO2ART  102.0       DATA:       Labs  CBC:   Recent Labs      07/30/18   0720  07/31/18   0800  08/01/18   0700   WBC  15.0*  7.2  7.2   HGB  13.3*  10.2*  14.2   HCT  39.6*  30.3*  42.4   PLT  216  171  227       BMP:   Recent Labs      07/30/18   0720  07/30/18   2129  08/01/18   0700   NA  142  143  143   K  4.0  3.5  4.3   CL  108  105  108   CO2  23  27  27   BUN  14  9  8   CREATININE  0.8*  0.8*  0.7*   GLUCOSE  94  98  96     LFT's:   Recent Labs      07/29/18   1932  08/01/18   0658   AST  164*  164*  450*   ALT  60*  60*  167*   BILITOT  0.3  0.3   --    ALKPHOS  110  109   --      Troponin: No results for input(s): TROPONINI in the last 72 hours. BNP: No results for input(s): BNP in the last 72 hours. ABGs:   Recent Labs      07/29/18   1430   PHART  7.382   RTB9FYT  39.7   PO2ART  102.0     INR: No results for input(s): INR in the last 72 hours. Lipids: No results for input(s): CHOL, HDL in the last 72 hours. Invalid input(s): LDLCALCU    U/A:  No results for input(s): NITRITE, COLORU, PHUR, LABCAST, WBCUA, RBCUA, MUCUS, TRICHOMONAS, YEAST, BACTERIA, CLARITYU, SPECGRAV, LEUKOCYTESUR, UROBILINOGEN, BILIRUBINUR, BLOODU, GLUCOSEU, AMORPHOUS in the last 72 hours. Invalid input(s): KETONESU     ASSESSMENT AND PLAN:   Conchita Park is a 38 y.o. male with PMH of convulsive seizures, ulcerative colitis admitted for seizure after following down at home.      Seizure  - loaded

## 2018-08-01 NOTE — PROGRESS NOTES
Pt alert and oriented. Pt VSS. Pt reports generalized pain, mostly in BLE as 8/10. Pt medicated w/ PO pain medication and pt reports relief. Pt neuro checks WNL. Pt up SBA x1 w/ gait belt. Pt unsteady and weak on his feet. Pt lung sounds are clear but diminished in lower lobes. Pt bowel sounds are active. Pt has several scabs, bruising and abrasions all over face. RN requested neosporin ointment from MD Garza. MD reported that she would put the order in but has not at this time. Pt remains free of falls this shift. Will continue to monitor.

## 2018-08-02 LAB
ALBUMIN SERPL-MCNC: 3.6 G/DL (ref 3.4–5)
ALP BLD-CCNC: 97 U/L (ref 40–129)
ALT SERPL-CCNC: 162 U/L (ref 10–40)
ANA INTERPRETATION: NORMAL
ANTI-NUCLEAR ANTIBODY (ANA): NEGATIVE
AST SERPL-CCNC: 363 U/L (ref 15–37)
BASOPHILS ABSOLUTE: 0 K/UL (ref 0–0.2)
BASOPHILS RELATIVE PERCENT: 0.4 %
BILIRUB SERPL-MCNC: <0.2 MG/DL (ref 0–1)
BILIRUBIN DIRECT: <0.2 MG/DL (ref 0–0.3)
BILIRUBIN, INDIRECT: ABNORMAL MG/DL (ref 0–1)
EOSINOPHILS ABSOLUTE: 0.2 K/UL (ref 0–0.6)
EOSINOPHILS RELATIVE PERCENT: 3.4 %
HCT VFR BLD CALC: 40.2 % (ref 40.5–52.5)
HEMOGLOBIN: 13.6 G/DL (ref 13.5–17.5)
LYMPHOCYTES ABSOLUTE: 1.8 K/UL (ref 1–5.1)
LYMPHOCYTES RELATIVE PERCENT: 31 %
MCH RBC QN AUTO: 31.2 PG (ref 26–34)
MCHC RBC AUTO-ENTMCNC: 33.8 G/DL (ref 31–36)
MCV RBC AUTO: 92.5 FL (ref 80–100)
MONOCYTES ABSOLUTE: 0.7 K/UL (ref 0–1.3)
MONOCYTES RELATIVE PERCENT: 12.2 %
NEUTROPHILS ABSOLUTE: 3.1 K/UL (ref 1.7–7.7)
NEUTROPHILS RELATIVE PERCENT: 53 %
PDW BLD-RTO: 15.1 % (ref 12.4–15.4)
PLATELET # BLD: 228 K/UL (ref 135–450)
PMV BLD AUTO: 7.9 FL (ref 5–10.5)
RBC # BLD: 4.35 M/UL (ref 4.2–5.9)
TOTAL CK: ABNORMAL U/L (ref 39–308)
TOTAL PROTEIN: 6.4 G/DL (ref 6.4–8.2)
WBC # BLD: 5.8 K/UL (ref 4–11)

## 2018-08-02 PROCEDURE — 2500000003 HC RX 250 WO HCPCS: Performed by: INTERNAL MEDICINE

## 2018-08-02 PROCEDURE — 6370000000 HC RX 637 (ALT 250 FOR IP): Performed by: STUDENT IN AN ORGANIZED HEALTH CARE EDUCATION/TRAINING PROGRAM

## 2018-08-02 PROCEDURE — 6360000002 HC RX W HCPCS: Performed by: STUDENT IN AN ORGANIZED HEALTH CARE EDUCATION/TRAINING PROGRAM

## 2018-08-02 PROCEDURE — 6370000000 HC RX 637 (ALT 250 FOR IP): Performed by: INTERNAL MEDICINE

## 2018-08-02 PROCEDURE — 36591 DRAW BLOOD OFF VENOUS DEVICE: CPT

## 2018-08-02 PROCEDURE — 36415 COLL VENOUS BLD VENIPUNCTURE: CPT

## 2018-08-02 PROCEDURE — 82550 ASSAY OF CK (CPK): CPT

## 2018-08-02 PROCEDURE — 2580000003 HC RX 258: Performed by: STUDENT IN AN ORGANIZED HEALTH CARE EDUCATION/TRAINING PROGRAM

## 2018-08-02 PROCEDURE — 85025 COMPLETE CBC W/AUTO DIFF WBC: CPT

## 2018-08-02 PROCEDURE — 80076 HEPATIC FUNCTION PANEL: CPT

## 2018-08-02 PROCEDURE — 1200000000 HC SEMI PRIVATE

## 2018-08-02 PROCEDURE — 2580000003 HC RX 258: Performed by: INTERNAL MEDICINE

## 2018-08-02 PROCEDURE — 36592 COLLECT BLOOD FROM PICC: CPT

## 2018-08-02 PROCEDURE — 6370000000 HC RX 637 (ALT 250 FOR IP): Performed by: NURSE PRACTITIONER

## 2018-08-02 RX ADMIN — HEPARIN SODIUM 5000 UNITS: 5000 INJECTION INTRAVENOUS; SUBCUTANEOUS at 15:23

## 2018-08-02 RX ADMIN — AMOXICILLIN AND CLAVULANATE POTASSIUM 1 TABLET: 875; 125 TABLET, FILM COATED ORAL at 09:45

## 2018-08-02 RX ADMIN — HEPARIN SODIUM 5000 UNITS: 5000 INJECTION INTRAVENOUS; SUBCUTANEOUS at 21:36

## 2018-08-02 RX ADMIN — ACETAMINOPHEN 650 MG: 325 TABLET, FILM COATED ORAL at 09:38

## 2018-08-02 RX ADMIN — BACITRACIN, NEOMYCIN, POLYMYXIN B: 400; 3.5; 5 OINTMENT TOPICAL at 09:38

## 2018-08-02 RX ADMIN — Medication 10 ML: at 09:34

## 2018-08-02 RX ADMIN — LEVETIRACETAM 500 MG: 500 TABLET ORAL at 09:37

## 2018-08-02 RX ADMIN — AMOXICILLIN AND CLAVULANATE POTASSIUM 1 TABLET: 875; 125 TABLET, FILM COATED ORAL at 21:36

## 2018-08-02 RX ADMIN — LEVETIRACETAM 500 MG: 500 TABLET ORAL at 21:37

## 2018-08-02 RX ADMIN — SODIUM BICARBONATE: 84 INJECTION, SOLUTION INTRAVENOUS at 09:33

## 2018-08-02 RX ADMIN — PHENYTOIN SODIUM 600 MG: 100 CAPSULE ORAL at 09:45

## 2018-08-02 RX ADMIN — HEPARIN SODIUM 5000 UNITS: 5000 INJECTION INTRAVENOUS; SUBCUTANEOUS at 05:40

## 2018-08-02 ASSESSMENT — PAIN SCALES - GENERAL
PAINLEVEL_OUTOF10: 3
PAINLEVEL_OUTOF10: 0

## 2018-08-02 NOTE — PLAN OF CARE
Problem: Falls - Risk of:  Goal: Will remain free from falls  Will remain free from falls   Outcome: Ongoing  Pt remains free of falls this shift. Bed is locked and in the lowest position. Bed alarm is on, call light and bedside table are within reach. Will continue to monitor. Problem: Pain:  Goal: Control of acute pain  Control of acute pain   Outcome: Ongoing  Pt complains of generalized pain, prn pain medication given with adequate relief. Will continue to monitor.

## 2018-08-02 NOTE — PROGRESS NOTES
Patient seen and examined  Doing well today pain under good control, cpk is now down trending. Pain under good control  Inflammatory work up likely negative. Hopefully can discharge tomorrow if cpk continues to downtrend. Full note to follow.

## 2018-08-03 VITALS
OXYGEN SATURATION: 98 % | HEIGHT: 66 IN | BODY MASS INDEX: 24.48 KG/M2 | RESPIRATION RATE: 16 BRPM | SYSTOLIC BLOOD PRESSURE: 122 MMHG | TEMPERATURE: 97.7 F | HEART RATE: 62 BPM | WEIGHT: 152.31 LBS | DIASTOLIC BLOOD PRESSURE: 75 MMHG

## 2018-08-03 LAB
ALT SERPL-CCNC: 170 U/L (ref 10–40)
ANION GAP SERPL CALCULATED.3IONS-SCNC: 10 MMOL/L (ref 3–16)
AST SERPL-CCNC: 299 U/L (ref 15–37)
BASOPHILS ABSOLUTE: 0 K/UL (ref 0–0.2)
BASOPHILS RELATIVE PERCENT: 0.5 %
BLOOD CULTURE, ROUTINE: NORMAL
BUN BLDV-MCNC: 12 MG/DL (ref 7–20)
CALCIUM SERPL-MCNC: 9.3 MG/DL (ref 8.3–10.6)
CHLORIDE BLD-SCNC: 103 MMOL/L (ref 99–110)
CO2: 30 MMOL/L (ref 21–32)
CREAT SERPL-MCNC: 0.6 MG/DL (ref 0.9–1.3)
CULTURE, BLOOD 2: NORMAL
EOSINOPHILS ABSOLUTE: 0.2 K/UL (ref 0–0.6)
EOSINOPHILS RELATIVE PERCENT: 3.7 %
F-ACTIN AB IGG: 10 UNITS (ref 0–19)
GFR AFRICAN AMERICAN: >60
GFR NON-AFRICAN AMERICAN: >60
GLUCOSE BLD-MCNC: 107 MG/DL (ref 70–99)
HCT VFR BLD CALC: 39.8 % (ref 40.5–52.5)
HEMOGLOBIN: 13.4 G/DL (ref 13.5–17.5)
LYMPHOCYTES ABSOLUTE: 1.5 K/UL (ref 1–5.1)
LYMPHOCYTES RELATIVE PERCENT: 26.3 %
MCH RBC QN AUTO: 31.4 PG (ref 26–34)
MCHC RBC AUTO-ENTMCNC: 33.7 G/DL (ref 31–36)
MCV RBC AUTO: 93.2 FL (ref 80–100)
MONOCYTES ABSOLUTE: 0.6 K/UL (ref 0–1.3)
MONOCYTES RELATIVE PERCENT: 10.8 %
NEUTROPHILS ABSOLUTE: 3.3 K/UL (ref 1.7–7.7)
NEUTROPHILS RELATIVE PERCENT: 58.7 %
PDW BLD-RTO: 15.1 % (ref 12.4–15.4)
PLATELET # BLD: 229 K/UL (ref 135–450)
PMV BLD AUTO: 7.4 FL (ref 5–10.5)
POTASSIUM SERPL-SCNC: 3.7 MMOL/L (ref 3.5–5.1)
RBC # BLD: 4.27 M/UL (ref 4.2–5.9)
SODIUM BLD-SCNC: 143 MMOL/L (ref 136–145)
TOTAL CK: ABNORMAL U/L (ref 39–308)
WBC # BLD: 5.5 K/UL (ref 4–11)

## 2018-08-03 PROCEDURE — 97535 SELF CARE MNGMENT TRAINING: CPT

## 2018-08-03 PROCEDURE — 80048 BASIC METABOLIC PNL TOTAL CA: CPT

## 2018-08-03 PROCEDURE — 84460 ALANINE AMINO (ALT) (SGPT): CPT

## 2018-08-03 PROCEDURE — 36415 COLL VENOUS BLD VENIPUNCTURE: CPT

## 2018-08-03 PROCEDURE — 82550 ASSAY OF CK (CPK): CPT

## 2018-08-03 PROCEDURE — 6360000002 HC RX W HCPCS: Performed by: STUDENT IN AN ORGANIZED HEALTH CARE EDUCATION/TRAINING PROGRAM

## 2018-08-03 PROCEDURE — 6370000000 HC RX 637 (ALT 250 FOR IP): Performed by: STUDENT IN AN ORGANIZED HEALTH CARE EDUCATION/TRAINING PROGRAM

## 2018-08-03 PROCEDURE — 6370000000 HC RX 637 (ALT 250 FOR IP): Performed by: NURSE PRACTITIONER

## 2018-08-03 PROCEDURE — 2580000003 HC RX 258: Performed by: INTERNAL MEDICINE

## 2018-08-03 PROCEDURE — 97530 THERAPEUTIC ACTIVITIES: CPT

## 2018-08-03 PROCEDURE — 84450 TRANSFERASE (AST) (SGOT): CPT

## 2018-08-03 PROCEDURE — 36591 DRAW BLOOD OFF VENOUS DEVICE: CPT

## 2018-08-03 PROCEDURE — 2500000003 HC RX 250 WO HCPCS: Performed by: INTERNAL MEDICINE

## 2018-08-03 PROCEDURE — 85025 COMPLETE CBC W/AUTO DIFF WBC: CPT

## 2018-08-03 PROCEDURE — 2580000003 HC RX 258: Performed by: STUDENT IN AN ORGANIZED HEALTH CARE EDUCATION/TRAINING PROGRAM

## 2018-08-03 PROCEDURE — 36592 COLLECT BLOOD FROM PICC: CPT

## 2018-08-03 RX ORDER — 0.9 % SODIUM CHLORIDE 0.9 %
2000 INTRAVENOUS SOLUTION INTRAVENOUS ONCE
Status: COMPLETED | OUTPATIENT
Start: 2018-08-03 | End: 2018-08-03

## 2018-08-03 RX ORDER — PHENYTOIN SODIUM 100 MG/1
600 CAPSULE, EXTENDED RELEASE ORAL DAILY
Qty: 60 CAPSULE | Refills: 3 | Status: SHIPPED | OUTPATIENT
Start: 2018-08-03 | End: 2019-04-29 | Stop reason: SDUPTHER

## 2018-08-03 RX ORDER — LEVETIRACETAM 500 MG/1
500 TABLET ORAL 2 TIMES DAILY
Qty: 60 TABLET | Refills: 3 | Status: SHIPPED | OUTPATIENT
Start: 2018-08-03 | End: 2018-11-15 | Stop reason: SDUPTHER

## 2018-08-03 RX ORDER — OXYCODONE HYDROCHLORIDE 5 MG/1
5 TABLET ORAL EVERY 8 HOURS PRN
Qty: 9 TABLET | Refills: 0 | Status: SHIPPED | OUTPATIENT
Start: 2018-08-03 | End: 2018-08-06

## 2018-08-03 RX ORDER — AMOXICILLIN AND CLAVULANATE POTASSIUM 875; 125 MG/1; MG/1
1 TABLET, FILM COATED ORAL EVERY 12 HOURS SCHEDULED
Qty: 20 TABLET | Refills: 0 | Status: SHIPPED | OUTPATIENT
Start: 2018-08-03 | End: 2018-08-13

## 2018-08-03 RX ADMIN — Medication 10 ML: at 09:49

## 2018-08-03 RX ADMIN — AMOXICILLIN AND CLAVULANATE POTASSIUM 1 TABLET: 875; 125 TABLET, FILM COATED ORAL at 09:34

## 2018-08-03 RX ADMIN — SODIUM CHLORIDE 2000 ML: 9 INJECTION, SOLUTION INTRAVENOUS at 09:19

## 2018-08-03 RX ADMIN — HEPARIN SODIUM 5000 UNITS: 5000 INJECTION INTRAVENOUS; SUBCUTANEOUS at 06:37

## 2018-08-03 RX ADMIN — PHENYTOIN SODIUM 600 MG: 100 CAPSULE ORAL at 09:33

## 2018-08-03 RX ADMIN — LEVETIRACETAM 500 MG: 500 TABLET ORAL at 09:20

## 2018-08-03 RX ADMIN — SODIUM BICARBONATE: 84 INJECTION, SOLUTION INTRAVENOUS at 03:27

## 2018-08-03 RX ADMIN — BACITRACIN, NEOMYCIN, POLYMYXIN B: 400; 3.5; 5 OINTMENT TOPICAL at 09:46

## 2018-08-03 NOTE — PROGRESS NOTES
Progress Note  PGY-1    Hospital Day: 5                                                         Code:Full Code  Admit Date: 7/29/2018                                 PCP: No primary care provider on file. Daily Plan:  8/2/2018    Subjective:   42 y.o. M p/w convulsive seizure.  Hx epilepsy (followed by  neurology).  Hx convulsive seizures occurring every few years (last at least 2 years ago), on phenytoin.         In the morning, pt was sitting comfortably in chair, eating breakfast. Denied chest pain, nausea, vomiting. Wanted to walk more. Face lesions considerably cleared up from yesterday. MEDICATIONS:   Scheduled Meds:   neomycin-bacitracin-polymyxin   Topical BID    LORazepam  0.5 mg Oral Once    phenytoin  600 mg Oral Daily    levETIRAcetam  500 mg Oral BID    amoxicillin-clavulanate  1 tablet Oral 2 times per day    nicotine  1 patch Transdermal Daily    sodium chloride flush  10 mL Intravenous 2 times per day    heparin (porcine)  5,000 Units Subcutaneous 3 times per day      Continuous Infusions:   IV infusion builder 150 mL/hr at 08/02/18 0933     PRN Meds:LORazepam, acetaminophen, oxyCODONE **OR** oxyCODONE, sodium chloride flush, magnesium hydroxide, ondansetron    Allergies: No Known Allergies    PHYSICAL EXAM:       Vitals: /77   Pulse 77   Temp 98.2 °F (36.8 °C) (Oral)   Resp 16   Wt 152 lb 5 oz (69.1 kg)   SpO2 97%     I/O:    Intake/Output Summary (Last 24 hours) at 08/02/18 2125  Last data filed at 08/02/18 1935   Gross per 24 hour   Intake              480 ml   Output             3625 ml   Net            -3145 ml     I/O this shift:  In: -   Out: 200 [Urine:200]  I/O last 3 completed shifts: In: 605 [P. O.:605]  Out: 5100 [Urine:5100]    Physical Examination:   · General appearance: Appears comfortable at rest, fully alert and orientated    · HEENT: Has bruises and scars on his face post fall, otherwise normocephalic. PERRL, EOMI.  Moist mucus

## 2018-08-03 NOTE — DISCHARGE SUMMARY
Hospital Medicine Discharge Summary    Patient: Vimal Hayes     MRN: [id number]  Gender: male  : 1975   Age: 38 y.o. Code Status: Full Code     Admit Date: 2018   Discharge Date:   2018  Disposition:    home    Primary Care Provider: No primary care provider on file. Admitting Physician: No admitting provider for patient encounter. Discharge Physician: Willem Ron MD       Discharge Diagnoses: Active Hospital Problems    Diagnosis Date Noted    Seizure [facility]) [R56.9] 2018       Admission HPI:  Vimal Hayes is a 37 yo m with PMH seizure disorder on phenytoin who was admitted to [facility] after being found down by his father. Patient mostly likely had a seizure (LOC, incontinent of urine). Subsequently had rhabdomyolysis and pt had complaint of reproducible chest pain and bilateral leg pain. Hospital Course: In the hospital, seizure medication was continued with phenytoin, added Keppra as well per neurology recommendations. EEG was in normal limits. MRI was obtained due to admission with seizure, was found to have acute structural defects in cortical matter. Was found to have extensive sinusitis disease, treated with PO augmentin for 10 day course in hospital. CK uptrended from 4420 on admission to 47, 750, downtrended with sodium bicarbonate fluid infusion and boluses to 21,000. Clinically improved by end of admission. For pain control in legs, sternum, was given oxycodone o/p.        Follow-up appointments:    O/p clinic f/u with Dr. Jacklyn Burkett    Medication Changes:    none      Discharge Medications:   amoxicillin-clavulanate 875-125 MG per tablet   Commonly known as: AUGMENTIN   Take 1 tablet by mouth every 12 hours for 10 days                                levETIRAcetam 500 MG tablet   Commonly known as: KEPPRA   Take 1 tablet by mouth 2 times daily                                oxyCODONE 5 MG immediate release tablet   Commonly known as: ROXICODONE   Take 1 tablet by mouth every 8 hours as needed for Pain for up to 3 days. Oseas Jumper Date: 8/3/18                                These are medications you told us you were taking at home, CONTINUE taking them after you leave the hospital     These are medications you told us you were taking at home, CONTINUE taking them after you leave the hospital    Recent Dose Next Dose Due AM NOON PM NIGHT   phenytoin 100 MG ER capsule   Commonly known as: DILANTIN   Take 6 capsules by mouth daily                            Exam:   /75   Pulse 62   Temp 97.7 °F (36.5 °C) (Oral)   Resp 16   Wt 152 lb 5 oz (69.1 kg)   SpO2 98%   · General appearance: Appears comfortable at rest, fully alert and orientated    · HEENT: Has bruises and scars on his face post fall, otherwise normocephalic. PERRL, EOMI. Moist mucus membranes  · Respiratory: Normal respiratory effort. No wheezes, rubs, or crackles  · Cardiovascular: regular S1/S2, with no Murmur, rub or gallop. · Abdomen: Soft, non-tender, non-distended  · Musculoskeletal: No clubbing, cyanosis, no lower extremity edema, peripheral pulses present, cap refill < 2sec  · Neurologic: Neurovascularly grossly intact without any focal motor deficits. Cranial nerves:  grossly non-focal.    Significant Test Results   Radiology:  Xr Lumbar Spine (2-3 Views)    Result Date: 7/29/2018  Lumbar spine 2 views HISTORY: Fall injury AP and crosstable lateral views obtained. L1 vertebral body is obscured on the lateral view by the patient's arms. The vertebral body heights are maintained. No fracture of the visualized vertebral bodies. Limited evaluation of L1 on the lateral view. No fracture    Ct Head Wo Contrast    Result Date: 7/29/2018  CT brain without contrast HISTORY: Fall injury. The ventricles and basilar cisterns are within normal limits No acute intra-axial or extra-axial hemorrhage.  Near complete opacification of the left maxillary sinus likely relates to combination of acute and chronic sinusitis. Mild opacity involving the right mastoid air cells may relate to mastoid effusion. No acute hemorrhage Left maxillary sinusitis. Ct Cervical Spine Wo Contrast    Result Date: 7/29/2018  CT cervical spine. HISTORY: Injury. Axial, sagittal, and coronal images. The vertebral body heights are maintained. No fracture. Mild to moderate emphysematous changes upper lobes.  view demonstrates marked deformity about the right clavicle likely relates to old fracture. No acute bony abnormality of the cervical spine. Xr Chest Portable    Result Date: 7/29/2018  Chest portable. HISTORY: Shortness of breath. COMPARISON: None Heart size is normal No focal consolidation or edema. Fracture involving the junction of middle and distal thirds of the right clavicle likely old. No consolidation    Mri Brain W Wo Contrast    Result Date: 7/30/2018  EXAM: MRI BRAIN W WO CONTRAST INDICATION:Breakthrough seizure COMPARISON: Head CT 7/29/2018. TECHNIQUE: Multiplanar, multisequence MR imaging of the head obtained. Pre and postcontrast images were obtained. IV contrast: 15 mL ProHance. FINDINGS: SINUSES AND OSSEOUS STRUCTURES: Extensive right mastoid air cell disease. Extensive left maxillary sinus disease. Ethmoid air cells and frontal sinuses are clear. Left mastoid air cells are clear. Marrow signal is unremarkable. ORBITS: Unremarkable MIDLINE STRUCTURES: The sella turcica and pituitary gland are normal. Craniovertebral alignment and cerebellar tonsils are normal. VENTRICLES: Normal size and configuration for patient age. Cisternal spaces are intact. INTRACRANIAL HEMORRHAGE: None. BRAIN PARENCHYMA: Brain parenchyma is normal signal. There is no diffusion restriction. Deretha Collet white differentiation is intact. No mass. No significant white matter signal abnormality. No enhancing abnormality. INTRACRANIAL VASCULATURE: Vascular flow voids are intact.      1. Extensive left maxillary sinus disease and right mastoid air cell disease. Correlate for possible sinusitis. 2. No acute intracranial abnormality. Normal MRI of the brain. Consults:     IP CONSULT TO HOSPITALIST  IP CONSULT TO NEUROLOGY    Labs: For convenience and continuity at follow-up the following most recent labs are provided:    Lab Results   Component Value Date    WBC 5.5 08/03/2018    HGB 13.4 08/03/2018    HCT 39.8 08/03/2018    MCV 93.2 08/03/2018     08/03/2018     08/01/2018    K 4.3 08/01/2018    K 4.6 07/29/2018     08/01/2018    CO2 27 08/01/2018    BUN 8 08/01/2018    CREATININE 0.7 08/01/2018    CALCIUM 9.2 08/01/2018    PHOS 2.8 07/30/2018    ALKPHOS 97 08/02/2018     08/03/2018     08/03/2018    BILITOT <0.2 08/02/2018    BILIDIR <0.2 08/02/2018    LABALBU 3.6 08/02/2018     No results found for: INR    Condition at Discharge:  Very Good    The patient was seen and examined on day of discharge and this discharge summary is in conjunction with any daily progress note from day of discharge. Time spent on discharge is more than 45 minutes in the examination, evaluation, counseling and review of medications and discharge plan. Signed:    Eli Maya, PGY-1  Pager: [phone]  8/3/2018  10:11 AM    Thank you No primary care provider on file. for the opportunity to be involved in this patient's care. Patient seen and examined, labs and imaging studies reviewed, agree with assessment and plan as outlined above. Continue with discharge planning discussed outpatient follow up. Check cpk level prior to discharge. Follow up at [facility] clinic next week with lft's transaminase elevation likely from skeletal muscle. Follow up with neurology at [facility]. Greater than 30 minutes spent on case on day of discharge full discharge note to follow.      Marlyn Barber MD [id number]

## 2018-08-03 NOTE — PROGRESS NOTES
Independent  Stand to sit: Independent  Comment: up ad izabela per whiteboard  Functional Mobility  Functional - Mobility Device: No device  Activity: Other (community distance in hallway, on adjoining unit)  Assist Level: Independent  Functional Mobility Comments: no LOB noted during mobility, independent to manage IV pole     Transfers  Sit to stand: Independent  Stand to sit: Independent                        Type of ROM/Therapeutic Exercise  Type of ROM/Therapeutic Exercise: AROM; Free weights  Comment: Educated pt to use soda cans for makeshift weights  Exercises  Chair Push-ups: B UE x 15  Elbow Flexion: B UE x 15  Elbow Extension: B UE x 15                    Assessment   Performance deficits / Impairments: Decreased functional mobility ; Decreased balance;Decreased ADL status  Assessment: Pt demonstrates near baseline this date, independent for ADLS and mobilty with no LOB. Pt HR WFL after long distance mobility. Educated on UE HEP to promote strength good understanding verb. No further skilled OT needs identified at this time, will sign off on OT. Treatment Diagnosis: decreased ADLs, balance and functional mobility s/p seizure  Patient Education: UE HEP- good understanding demod and verb  REQUIRES OT FOLLOW UP: No  Activity Tolerance  Activity Tolerance: Patient Tolerated treatment well  Activity Tolerance: PT reported no fatigue. HR 79 s/p long distance mobility in hallway  Safety Devices  Safety Devices in place: Yes  Type of devices: Nurse notified; Left in chair;Call light within reach (pt up ad izabela per RN)          Plan   Plan  Times per week: 2-5  Times per day: Daily          AM-PAC Score        AM-[facility] Inpatient Daily Activity Raw Score: 23  AM-PAC Inpatient ADL T-Scale Score : 51.12  ADL Inpatient CMS 0-100% Score: 15.86  ADL Inpatient CMS G-Code Modifier : CI    Goals  Short term goals  Time Frame for Short term goals: by discharge- all goals met 8/3  Short term goal 1: supervision for ADL item

## 2018-08-03 NOTE — PROGRESS NOTES
med's to beds , scripts filled, given to patient , pt ort follow up with clinic aware of discharge instructions.

## 2018-08-06 DIAGNOSIS — R74.8 ELEVATED CPK: ICD-10-CM

## 2018-08-06 LAB
ALBUMIN SERPL-MCNC: 4.5 G/DL (ref 3.4–5)
ALP BLD-CCNC: 105 U/L (ref 40–129)
ALT SERPL-CCNC: 157 U/L (ref 10–40)
ANION GAP SERPL CALCULATED.3IONS-SCNC: 12 MMOL/L (ref 3–16)
AST SERPL-CCNC: 82 U/L (ref 15–37)
BILIRUB SERPL-MCNC: <0.2 MG/DL (ref 0–1)
BILIRUBIN DIRECT: <0.2 MG/DL (ref 0–0.3)
BILIRUBIN, INDIRECT: ABNORMAL MG/DL (ref 0–1)
BUN BLDV-MCNC: 20 MG/DL (ref 7–20)
CALCIUM SERPL-MCNC: 9.9 MG/DL (ref 8.3–10.6)
CHLORIDE BLD-SCNC: 102 MMOL/L (ref 99–110)
CO2: 27 MMOL/L (ref 21–32)
CREAT SERPL-MCNC: 0.8 MG/DL (ref 0.9–1.3)
GFR AFRICAN AMERICAN: >60
GFR NON-AFRICAN AMERICAN: >60
GLUCOSE BLD-MCNC: 96 MG/DL (ref 70–99)
PHOSPHORUS: 3.6 MG/DL (ref 2.5–4.9)
POTASSIUM SERPL-SCNC: 5 MMOL/L (ref 3.5–5.1)
SODIUM BLD-SCNC: 141 MMOL/L (ref 136–145)
TOTAL CK: 1813 U/L (ref 39–308)
TOTAL PROTEIN: 7.1 G/DL (ref 6.4–8.2)

## 2018-08-09 ENCOUNTER — OFFICE VISIT (OUTPATIENT)
Dept: INTERNAL MEDICINE CLINIC | Age: 43
End: 2018-08-09
Payer: COMMERCIAL

## 2018-08-09 VITALS
HEART RATE: 90 BPM | WEIGHT: 149.4 LBS | OXYGEN SATURATION: 98 % | SYSTOLIC BLOOD PRESSURE: 112 MMHG | BODY MASS INDEX: 24.11 KG/M2 | DIASTOLIC BLOOD PRESSURE: 70 MMHG | TEMPERATURE: 98 F | RESPIRATION RATE: 16 BRPM

## 2018-08-09 DIAGNOSIS — R56.9 SEIZURE (HCC): Primary | ICD-10-CM

## 2018-08-09 LAB
ALBUMIN SERPL-MCNC: 4.3 G/DL (ref 3.4–5)
ALP BLD-CCNC: 108 U/L (ref 40–129)
ALT SERPL-CCNC: 79 U/L (ref 10–40)
ANION GAP SERPL CALCULATED.3IONS-SCNC: 10 MMOL/L (ref 3–16)
AST SERPL-CCNC: 37 U/L (ref 15–37)
BILIRUB SERPL-MCNC: <0.2 MG/DL (ref 0–1)
BILIRUBIN DIRECT: <0.2 MG/DL (ref 0–0.3)
BILIRUBIN, INDIRECT: ABNORMAL MG/DL (ref 0–1)
BUN BLDV-MCNC: 16 MG/DL (ref 7–20)
CALCIUM SERPL-MCNC: 9.4 MG/DL (ref 8.3–10.6)
CHLORIDE BLD-SCNC: 102 MMOL/L (ref 99–110)
CO2: 28 MMOL/L (ref 21–32)
CREAT SERPL-MCNC: 0.6 MG/DL (ref 0.9–1.3)
GFR AFRICAN AMERICAN: >60
GFR NON-AFRICAN AMERICAN: >60
GLUCOSE BLD-MCNC: 64 MG/DL (ref 70–99)
PHOSPHORUS: 3 MG/DL (ref 2.5–4.9)
POTASSIUM SERPL-SCNC: 3.9 MMOL/L (ref 3.5–5.1)
SODIUM BLD-SCNC: 140 MMOL/L (ref 136–145)
TOTAL CK: 658 U/L (ref 39–308)
TOTAL PROTEIN: 7.6 G/DL (ref 6.4–8.2)

## 2018-08-09 PROCEDURE — 84100 ASSAY OF PHOSPHORUS: CPT

## 2018-08-09 PROCEDURE — 36415 COLL VENOUS BLD VENIPUNCTURE: CPT

## 2018-08-09 PROCEDURE — 99213 OFFICE O/P EST LOW 20 MIN: CPT | Performed by: STUDENT IN AN ORGANIZED HEALTH CARE EDUCATION/TRAINING PROGRAM

## 2018-08-09 PROCEDURE — 80048 BASIC METABOLIC PNL TOTAL CA: CPT

## 2018-08-09 PROCEDURE — 82550 ASSAY OF CK (CPK): CPT

## 2018-08-09 PROCEDURE — 80076 HEPATIC FUNCTION PANEL: CPT

## 2018-08-09 NOTE — PATIENT INSTRUCTIONS
Labs done today. You should return to see your doctor at [facility] in 3 months. DO NOT run out of medication. Call your doctor when you still have 7 days worth of medication left and get refill. Call for sooner appointment with UC   if problems arise or go to ER if needed. Patient decided want to be medical patient here. . Will follow Neuro at [facility].

## 2018-08-09 NOTE — PROGRESS NOTES
[facility]       NURSING PROGRESS NOTE      August 9, 2018  Conchita Park    Chief Complaint: No chief complaint on file. Constitutional: negative  Eyes: negative  Ears, nose, mouth, throat, and face: negative  Respiratory: negative  Cardiovascular: negative  Gastrointestinal: negative  Genitourinary: negative  Integument/breast: negative  Hematologic/lymphatic: negative  Musculoskeletal: negative  Neurological: negative, was brought to hospital due to seizures. Has had none since discharge,  Diabetes: No  Yes:  Medication compliance:    Diabetic diet compliance:    Home glucose monitoring:    Last eye exam:     Acute symptoms hyperglycemia:    Acute symptoms hypoglycemia:    POC glucose today:  mg/dL    Pain Assessment:  Pain Present: no  Pain Score: 0  Pain Quality/Description:     Mobility/Safety/Self-Care:  Steady Gait: Yes  History of Falls: No   History of a Fall within the last 30 days No  Assistive Device: None  Poor Hygiene: No    Psychosocial:   Depression: No  If YES,    Does Patient express current thoughts of harming self or others?: No  Anxious: No  Insomnia: No  Inappropriate Behavior: No  Alcohol Abuse: no  Substance Abuse: no  Signs of Physical Abuse: No  Signs of Emotional Abuse: No    Educational:  Identify the learner who is being assessed for education:  patient                    Ability to Learn:  Exhibits ability to grasp concepts and respond to questions: Medium  Ready to Learn: No  calm   Preferred Method of Learning:  written  Barriers to Learning: Verbalizes interest  Special Considerations due to cultural, Sabianism, spiritual beliefs:  No  Language:  English  :  No    Note:   States he goes to [facility] . He states he has medications, No refills needed at this time. No seizures .        8:54 AM 8/9/2018

## 2018-08-09 NOTE — PROGRESS NOTES
Department Of Internal Medicine  General Medicine/Primary Care  Established Patient Visit    Patient:  Carmen Galindo                                               : 1975  Age: 38 y.o. MRN: [id number]  Date : 2018    History Obtained From: patient    REASON FOR VISIT: hospital follow up    HISTORY OF PRESENT ILLNESS:   Carmen Galindo is a 37 yo m with PMH seizure disorder on phenytoin who was admitted to [facility] after being found down by his father. Patient mostly likely had a seizure (LOC, incontinent of urine). Subsequently had rhabdomyolysis and pt had complaint of reproducible chest pain and bilateral leg pain. In the hospital, seizure medication was continued with phenytoin, added Keppra as well per neurology recommendations. EEG was in normal limits. MRI was obtained due to admission with seizure, was found to have no acute structural defects in cortical matter. Was found to have extensive sinusitis disease, treated with PO augmentin for 10 day course in hospital. CK uptrended from 4420 on admission to 47, 750, downtrended with sodium bicarbonate fluid infusion and boluses to 21,000. Clinically improved by end of admission. For pain control in legs, sternum, was given oxycodone o/p. Short summary: history of seizure on phenytoin, found down by father incontinent of urine   - rhabdomyolysis resolved with IVF   - Keppra added by Neurology   - MRI with extensive L maxillary sinusitis treated with Augmentin   - discharged 8/3. Past Medical History:        Diagnosis Date    Seizures [facility])      Past Surgical History:    No past surgical history on file. Family History:   No family history on file. Social History:   TOBACCO:   reports that he has been smoking Cigarettes. He has been smoking about 0.50 packs per day. He has quit using smokeless tobacco.  ETOH:   reports that he does not drink alcohol. OCCUPATION:      Allergies:  Patient has no known allergies.     Current Medications: Prior to Admission medications    Medication Sig Start Date End Date Taking? Authorizing Provider   levETIRAcetam (KEPPRA) 500 MG tablet Take 1 tablet by mouth 2 times daily 8/3/18   Earnest Fothergill, MD   phenytoin (DILANTIN) 100 MG ER capsule Take 6 capsules by mouth daily 8/3/18   Earnest Fothergill, MD   amoxicillin-clavulanate (AUGMENTIN) 875-125 MG per tablet Take 1 tablet by mouth every 12 hours for 10 days 8/3/18 8/13/18  Earnest Fothergill, MD   phenytoin (DILANTIN) 100 MG ER capsule Take 600 mg by mouth 2 times daily    Historical Provider, MD   HYDROcodone-acetaminophen (NORCO) 5-325 MG per tablet Take 1 tablet by mouth every 6 hours as needed for Pain . 5/8/17   Elaina Gómez PA-C   ibuprofen (ADVIL;MOTRIN) 400 MG tablet Take 1 tablet by mouth every 6 hours as needed for Pain 5/8/17   Elaina Gómez PA-C     Physical Exam:      Vitals: There were no vitals taken for this visit. There is no height or weight on file to calculate BMI. Wt Readings from Last 3 Encounters:   07/29/18 152 lb 5 oz (69.1 kg)   09/25/17 134 lb 4.2 oz (60.9 kg)   05/08/17 135 lb 9.3 oz (61.5 kg)     LABS:    Chemistry:  No results for input(s): BUN, CREATININE, NA, K, CO2, CL, MG, PHOS, AST, ALT, ALB, PROT in the last 72 hours. Invalid input(s): GLU, CA, TBILI, DBILI, ALP, GLUFASTING    No results for input(s): ALKPHOS, ALT, AST, PROT, BILITOT, BILIDIR, LABALBU in the last 72 hours. No results for input(s): Benjy Deck, LABMICR, MICROALBUR, Marilee Contreras in the last 72 hours. Lab Results   Component Value Date    TSH 2.02 07/29/2018     Hematology:  No results for input(s): WBC, HGB, HCT, PLT, MCV, MCH, MCHC, RDW, EOSABS in the last 72 hours.     Invalid input(s): NEUTP, LYMPHP, MONOSP, EOSP, BASOP, NEUTABS, LYMPHABS, MONOABS, BASOABS    No results found for: IRON, TIBC, FERRITIN, FOLATE, ADCZDQPM75, PTH    Lipid:  No results found for: CHOL, HDL, LDLCALC, TRIG    U/A:  Lab Results   Component Value Date LABMICR YES 07/29/2018     Imaging:   Xr Lumbar Spine (2-3 Views)    Result Date: 7/29/2018  Lumbar spine 2 views HISTORY: Fall injury AP and crosstable lateral views obtained. L1 vertebral body is obscured on the lateral view by the patient's arms. The vertebral body heights are maintained. No fracture of the visualized vertebral bodies. Limited evaluation of L1 on the lateral view. No fracture    Ct Head Wo Contrast    Result Date: 7/29/2018  CT brain without contrast HISTORY: Fall injury. The ventricles and basilar cisterns are within normal limits No acute intra-axial or extra-axial hemorrhage. Near complete opacification of the left maxillary sinus likely relates to combination of acute and chronic sinusitis. Mild opacity involving the right mastoid air cells may relate to mastoid effusion. No acute hemorrhage Left maxillary sinusitis. Ct Cervical Spine Wo Contrast    Result Date: 7/29/2018  CT cervical spine. HISTORY: Injury. Axial, sagittal, and coronal images. The vertebral body heights are maintained. No fracture. Mild to moderate emphysematous changes upper lobes.  view demonstrates marked deformity about the right clavicle likely relates to old fracture. No acute bony abnormality of the cervical spine. Xr Chest Portable    Result Date: 7/29/2018  Chest portable. HISTORY: Shortness of breath. COMPARISON: None Heart size is normal No focal consolidation or edema. Fracture involving the junction of middle and distal thirds of the right clavicle likely old. No consolidation    Mri Brain W Wo Contrast    Result Date: 7/30/2018  EXAM: MRI BRAIN W WO CONTRAST INDICATION:Breakthrough seizure COMPARISON: Head CT 7/29/2018. TECHNIQUE: Multiplanar, multisequence MR imaging of the head obtained. Pre and postcontrast images were obtained. IV contrast: 15 mL ProHance. FINDINGS: SINUSES AND OSSEOUS STRUCTURES: Extensive right mastoid air cell disease. Extensive left maxillary sinus disease. Ethmoid air cells and frontal sinuses are clear. Left mastoid air cells are clear. Marrow signal is unremarkable. ORBITS: Unremarkable MIDLINE STRUCTURES: The sella turcica and pituitary gland are normal. Craniovertebral alignment and cerebellar tonsils are normal. VENTRICLES: Normal size and configuration for patient age. Cisternal spaces are intact. INTRACRANIAL HEMORRHAGE: None. BRAIN PARENCHYMA: Brain parenchyma is normal signal. There is no diffusion restriction. Faylene Funez white differentiation is intact. No mass. No significant white matter signal abnormality. No enhancing abnormality. INTRACRANIAL VASCULATURE: Vascular flow voids are intact. 1. Extensive left maxillary sinus disease and right mastoid air cell disease. Correlate for possible sinusitis. 2. No acute intracranial abnormality. Normal MRI of the brain. Assessment/Plan:     Vani Fernandes is a 37 yo m with PMH seizure disorder, presented as a hospital follow up. 1. Seizure Disorder  - continue phenytoin and Keppra  - will obtain CK, hepatic panel, renal function today    Patient will be discussed with attending, Florencia [facility].     Rose Mary Stokes MD PGY-1  Pager [phone]  8/9/2018  8:46 AM

## 2018-08-10 NOTE — PROGRESS NOTES
Multiple attempts made to reach patient over mobile phone to provide update on 8/9 blood work and discussion with Neurologist,  [facility] [place] about how he should not be driving for 3 months until seizure free by the [facility]. Patient does not answer on multiple attempts and no voicemail available. Will continue to try throughout the week.     Renata Post MD PGY2  [phone]  8/10/2:01PM

## 2018-09-21 LAB
EKG ATRIAL RATE: 76 BPM
EKG DIAGNOSIS: NORMAL
EKG P AXIS: 85 DEGREES
EKG P-R INTERVAL: 144 MS
EKG Q-T INTERVAL: 362 MS
EKG QRS DURATION: 106 MS
EKG QTC CALCULATION (BAZETT): 407 MS
EKG R AXIS: 83 DEGREES
EKG T AXIS: 75 DEGREES
EKG VENTRICULAR RATE: 76 BPM

## 2018-11-15 ENCOUNTER — OFFICE VISIT (OUTPATIENT)
Dept: INTERNAL MEDICINE CLINIC | Age: 43
End: 2018-11-15
Payer: COMMERCIAL

## 2018-11-15 VITALS
RESPIRATION RATE: 16 BRPM | HEART RATE: 74 BPM | BODY MASS INDEX: 23.92 KG/M2 | DIASTOLIC BLOOD PRESSURE: 84 MMHG | OXYGEN SATURATION: 97 % | TEMPERATURE: 98.1 F | WEIGHT: 148.2 LBS | SYSTOLIC BLOOD PRESSURE: 144 MMHG

## 2018-11-15 DIAGNOSIS — R56.9 SEIZURE (HCC): Primary | ICD-10-CM

## 2018-11-15 PROCEDURE — 99213 OFFICE O/P EST LOW 20 MIN: CPT | Performed by: STUDENT IN AN ORGANIZED HEALTH CARE EDUCATION/TRAINING PROGRAM

## 2018-11-15 RX ORDER — LEVETIRACETAM 500 MG/1
500 TABLET ORAL 2 TIMES DAILY
Qty: 60 TABLET | Refills: 3 | Status: SHIPPED | OUTPATIENT
Start: 2018-11-15 | End: 2018-11-15 | Stop reason: SDUPTHER

## 2018-11-15 RX ORDER — LEVETIRACETAM 500 MG/1
500 TABLET ORAL DAILY
Qty: 60 TABLET | Refills: 3 | Status: SHIPPED | OUTPATIENT
Start: 2018-11-15 | End: 2019-05-15 | Stop reason: SDUPTHER

## 2018-11-15 NOTE — PROGRESS NOTES
Department Of Internal Medicine  General Medicine/Primary Care  Established Patient Visit    Patient:  Jackson Huerta                                               : 1975  Age: 38 y.o. MRN: [id number]  Date : 11/15/2018    History Obtained From: patient    REASON FOR VISIT: routine clinic visit    HISTORY OF PRESENT ILLNESS:   Here for routine clinic visit. Prior History:  Jackson Huerta is a 37 yo m with PMH seizure disorder on phenytoin who was admitted to [facility] after being found down by his father. Patient mostly likely had a seizure (LOC, incontinent of urine). Subsequently had rhabdomyolysis and pt had complaint of reproducible chest pain and bilateral leg pain. In the hospital, seizure medication was continued with phenytoin, added Keppra as well per neurology recommendations. EEG was in normal limits. MRI was obtained due to admission with seizure, was found to have no acute structural defects in cortical matter. Was found to have extensive sinusitis disease, treated with PO augmentin for 10 day course in hospital. CK uptrended from 4420 on admission to 47, 750, downtrended with sodium bicarbonate fluid infusion and boluses to 21,000. Clinically improved by end of admission. For pain control in legs, sternum, was given oxycodone o/p. Short summary: history of seizure on phenytoin, found down by father incontinent of urine   - rhabdomyolysis resolved with IVF   - Keppra added by Neurology   - MRI with extensive L maxillary sinusitis treated with Augmentin   - discharged 8/3. Past Medical History:        Diagnosis Date    Seizures [facility])      Past Surgical History:    No past surgical history on file. Family History:   No family history on file. Social History:   TOBACCO:   reports that he has been smoking Cigarettes. He has been smoking about 0.50 packs per day. He has quit using smokeless tobacco.  ETOH:   reports that he does not drink alcohol.   OCCUPATION:      Allergies:  Patient 7/29/2018  Lumbar spine 2 views HISTORY: Fall injury AP and crosstable lateral views obtained. L1 vertebral body is obscured on the lateral view by the patient's arms. The vertebral body heights are maintained. No fracture of the visualized vertebral bodies. Limited evaluation of L1 on the lateral view. No fracture    Ct Head Wo Contrast    Result Date: 7/29/2018  CT brain without contrast HISTORY: Fall injury. The ventricles and basilar cisterns are within normal limits No acute intra-axial or extra-axial hemorrhage. Near complete opacification of the left maxillary sinus likely relates to combination of acute and chronic sinusitis. Mild opacity involving the right mastoid air cells may relate to mastoid effusion. No acute hemorrhage Left maxillary sinusitis. Ct Cervical Spine Wo Contrast    Result Date: 7/29/2018  CT cervical spine. HISTORY: Injury. Axial, sagittal, and coronal images. The vertebral body heights are maintained. No fracture. Mild to moderate emphysematous changes upper lobes.  view demonstrates marked deformity about the right clavicle likely relates to old fracture. No acute bony abnormality of the cervical spine. Xr Chest Portable    Result Date: 7/29/2018  Chest portable. HISTORY: Shortness of breath. COMPARISON: None Heart size is normal No focal consolidation or edema. Fracture involving the junction of middle and distal thirds of the right clavicle likely old. No consolidation    Mri Brain W Wo Contrast    Result Date: 7/30/2018  EXAM: MRI BRAIN W WO CONTRAST INDICATION:Breakthrough seizure COMPARISON: Head CT 7/29/2018. TECHNIQUE: Multiplanar, multisequence MR imaging of the head obtained. Pre and postcontrast images were obtained. IV contrast: 15 mL ProHance. FINDINGS: SINUSES AND OSSEOUS STRUCTURES: Extensive right mastoid air cell disease. Extensive left maxillary sinus disease. Ethmoid air cells and frontal sinuses are clear. Left mastoid air cells are clear.

## 2019-04-29 DIAGNOSIS — R56.9 SEIZURE (HCC): ICD-10-CM

## 2019-04-29 RX ORDER — PHENYTOIN SODIUM 100 MG/1
600 CAPSULE, EXTENDED RELEASE ORAL DAILY
Qty: 60 CAPSULE | Refills: 2
Start: 2019-04-29 | End: 2019-04-30 | Stop reason: SDUPTHER

## 2019-04-29 RX ORDER — PHENYTOIN SODIUM 100 MG/1
CAPSULE, EXTENDED RELEASE ORAL
Qty: 60 CAPSULE | Refills: 3 | OUTPATIENT
Start: 2019-04-29

## 2019-04-30 DIAGNOSIS — R56.9 SEIZURE (HCC): ICD-10-CM

## 2019-04-30 RX ORDER — PHENYTOIN SODIUM 100 MG/1
600 CAPSULE, EXTENDED RELEASE ORAL DAILY
Qty: 84 CAPSULE | Refills: 2 | OUTPATIENT
Start: 2019-04-30 | End: 2019-05-15 | Stop reason: SDUPTHER

## 2019-05-15 ENCOUNTER — OFFICE VISIT (OUTPATIENT)
Dept: INTERNAL MEDICINE CLINIC | Age: 44
End: 2019-05-15
Payer: COMMERCIAL

## 2019-05-15 VITALS
TEMPERATURE: 98.1 F | OXYGEN SATURATION: 98 % | RESPIRATION RATE: 16 BRPM | SYSTOLIC BLOOD PRESSURE: 129 MMHG | DIASTOLIC BLOOD PRESSURE: 72 MMHG | BODY MASS INDEX: 24.6 KG/M2 | HEART RATE: 76 BPM | WEIGHT: 152.4 LBS

## 2019-05-15 DIAGNOSIS — Z00.00 HEALTHCARE MAINTENANCE: Primary | ICD-10-CM

## 2019-05-15 DIAGNOSIS — R56.9 SEIZURE (HCC): ICD-10-CM

## 2019-05-15 PROCEDURE — 99213 OFFICE O/P EST LOW 20 MIN: CPT | Performed by: INTERNAL MEDICINE

## 2019-05-15 RX ORDER — LEVETIRACETAM 500 MG/1
500 TABLET ORAL DAILY
Qty: 30 TABLET | Refills: 2 | Status: SHIPPED | OUTPATIENT
Start: 2019-05-15 | End: 2019-07-31 | Stop reason: SDUPTHER

## 2019-05-15 RX ORDER — PHENYTOIN SODIUM 100 MG/1
600 CAPSULE, EXTENDED RELEASE ORAL DAILY
Qty: 180 CAPSULE | Refills: 2 | Status: SHIPPED | OUTPATIENT
Start: 2019-05-15 | End: 2019-07-31 | Stop reason: SDUPTHER

## 2019-05-15 RX ORDER — PHENYTOIN SODIUM 100 MG/1
600 CAPSULE, EXTENDED RELEASE ORAL DAILY
Qty: 84 CAPSULE | Refills: 2 | Status: SHIPPED | OUTPATIENT
Start: 2019-05-15 | End: 2019-05-15 | Stop reason: SDUPTHER

## 2019-05-15 ASSESSMENT — ENCOUNTER SYMPTOMS
ABDOMINAL PAIN: 0
CONSTIPATION: 0
DIARRHEA: 0
SHORTNESS OF BREATH: 0

## 2019-05-15 NOTE — PROGRESS NOTES
Outpatient Note for New Patient Visit    Patient:  Rosie Roy                                               : 1975  Age: 41 y.o. MRN: [id number]  Date : 5/15/2019    History Obtained From:  patient    CHIEF COMPLAINT:  Med refill     HISTORY OF PRESENT ILLNESS:   The patient is a 41 y.o. male who has a history of TBI in  and seizure disorder characterized by focal partial seizures as well complex partial seizures. He was following with a  neurologist and was given the ok to continue on with PCP taking over prescribing antiepileptic medications. No recent breakthrough seizures. Will run out of meds soon so made an appointment. Past Medical History:        Diagnosis Date    Seizures [facility])        Past Surgical History:    No past surgical history on file. Family History:   No family history on file. SocialHistory:   TOBACCO:   reports that he has been smoking cigarettes. He has been smoking about 0.50 packs per day. He has quit using smokeless tobacco.  ETOH:   reports that he does not drink alcohol. OCCUPATION:      Allergies: Patient has no known allergies. Current Medications:    Prior to Admission medications    Medication Sig Start Date End Date Taking? Authorizing Provider   levETIRAcetam (KEPPRA) 500 MG tablet Take 1 tablet by mouth daily 5/15/19  Yes Amy Foss MD   phenytoin (DILANTIN) 100 MG ER capsule Take 6 capsules by mouth daily 5/15/19  Yes Amy Foss MD       REVIEW OF SYSTEMS:  Review of Systems   Constitutional: Negative for chills, fatigue and fever. Respiratory: Negative for shortness of breath. Cardiovascular: Negative for chest pain. Gastrointestinal: Negative for abdominal pain, constipation and diarrhea. Genitourinary: Negative for dysuria. Musculoskeletal: Negative for arthralgias. Neurological: Negative for dizziness, weakness and headaches. Psychiatric/Behavioral: Negative for agitation and decreased concentration.  The patient is

## 2019-05-15 NOTE — PROGRESS NOTES
[facility]       NURSING PROGRESS NOTE      May 15, 2019  Eli De La Cruz    Chief Complaint:   Chief Complaint   Patient presents with    Seizures       Constitutional: negative  Eyes: negative  Ears, nose, mouth, throat, and face: negative  Respiratory: negative  Cardiovascular: negative  Gastrointestinal: negative  Genitourinary: negative  Integument/breast: negative  Hematologic/lymphatic: negative  Musculoskeletal: negative  Neurological: negative, History of seizures.  Last seizure was in Augest 2018  Diabetes: No  Yes:  Medication compliance:    Diabetic diet compliance:  ome glucose monitoring:    Last eye exam:     Acute symptoms hyperglycemia:    Acute symptoms hypoglycemia:    POC glucose today:    mg/dL    Pain Assessment:  Pain Present: no  Pain Score:   Pain Quality/Description:     Mobility/Safety/Self-Care:  Steady Gait: Yes  History of Falls: No   History of a Fall within the last 30 days No  Assistive Device: None  Poor Hygiene: No    Psychosocial:   Depression: No  If YES,    Does Patient express current thoughts of harming self or others?: No  Anxious: No  Insomnia: No  Inappropriate Behavior: No  Alcohol Abuse: no  Substance Abuse: no  Signs of Physical Abuse: No  Signs of Emotional Abuse: No    Educational:  Identify the learner who is being assessed for education:  patient                    Ability to Learn:  Exhibits ability to grasp concepts and respond to questions: Medium  Ready to Learn: No  calm   Preferred Method of Learning:  written  Barriers to Learning: Verbalizes interest  Special Considerations due to cultural, Amish, spiritual beliefs:  No  Language:  English  :  No    Note:         9:35 AM 5/15/2019

## 2019-07-31 ENCOUNTER — OFFICE VISIT (OUTPATIENT)
Dept: INTERNAL MEDICINE CLINIC | Age: 44
End: 2019-07-31
Payer: COMMERCIAL

## 2019-07-31 VITALS
TEMPERATURE: 97.7 F | RESPIRATION RATE: 18 BRPM | SYSTOLIC BLOOD PRESSURE: 140 MMHG | WEIGHT: 148.9 LBS | HEART RATE: 76 BPM | DIASTOLIC BLOOD PRESSURE: 90 MMHG | OXYGEN SATURATION: 98 % | BODY MASS INDEX: 23.93 KG/M2 | HEIGHT: 66 IN

## 2019-07-31 DIAGNOSIS — Z23 NEED FOR PROPHYLACTIC VACCINATION AGAINST STREPTOCOCCUS PNEUMONIAE (PNEUMOCOCCUS): ICD-10-CM

## 2019-07-31 DIAGNOSIS — R56.9 SEIZURE (HCC): ICD-10-CM

## 2019-07-31 DIAGNOSIS — Z00.00 HEALTH CARE MAINTENANCE: Primary | ICD-10-CM

## 2019-07-31 PROBLEM — F17.200 SMOKING: Status: ACTIVE | Noted: 2019-07-31

## 2019-07-31 PROCEDURE — G0009 ADMIN PNEUMOCOCCAL VACCINE: HCPCS | Performed by: STUDENT IN AN ORGANIZED HEALTH CARE EDUCATION/TRAINING PROGRAM

## 2019-07-31 PROCEDURE — 99213 OFFICE O/P EST LOW 20 MIN: CPT | Performed by: STUDENT IN AN ORGANIZED HEALTH CARE EDUCATION/TRAINING PROGRAM

## 2019-07-31 PROCEDURE — 90732 PPSV23 VACC 2 YRS+ SUBQ/IM: CPT | Performed by: STUDENT IN AN ORGANIZED HEALTH CARE EDUCATION/TRAINING PROGRAM

## 2019-07-31 PROCEDURE — 6360000002 HC RX W HCPCS: Performed by: STUDENT IN AN ORGANIZED HEALTH CARE EDUCATION/TRAINING PROGRAM

## 2019-07-31 RX ORDER — LEVETIRACETAM 500 MG/1
500 TABLET ORAL DAILY
Qty: 90 TABLET | Refills: 5 | Status: SHIPPED | OUTPATIENT
Start: 2019-07-31 | End: 2019-12-11 | Stop reason: SDUPTHER

## 2019-07-31 RX ORDER — LEVETIRACETAM 500 MG/1
500 TABLET ORAL DAILY
Qty: 90 TABLET | Refills: 2 | Status: SHIPPED | OUTPATIENT
Start: 2019-07-31 | End: 2019-07-31

## 2019-07-31 RX ORDER — PHENYTOIN SODIUM 100 MG/1
600 CAPSULE, EXTENDED RELEASE ORAL DAILY
Qty: 180 CAPSULE | Refills: 2 | Status: SHIPPED | OUTPATIENT
Start: 2019-07-31 | End: 2019-07-31

## 2019-07-31 RX ORDER — PHENYTOIN SODIUM 100 MG/1
600 CAPSULE, EXTENDED RELEASE ORAL DAILY
Qty: 180 CAPSULE | Refills: 5 | Status: SHIPPED | OUTPATIENT
Start: 2019-07-31 | End: 2019-12-11 | Stop reason: SDUPTHER

## 2019-07-31 RX ADMIN — PNEUMOCOCCAL VACCINE POLYVALENT 0.5 ML
25; 25; 25; 25; 25; 25; 25; 25; 25; 25; 25; 25; 25; 25; 25; 25; 25; 25; 25; 25; 25; 25; 25 INJECTION, SOLUTION INTRAMUSCULAR; SUBCUTANEOUS at 09:10

## 2019-07-31 ASSESSMENT — ENCOUNTER SYMPTOMS
DIARRHEA: 0
NAUSEA: 0
WHEEZING: 0
COUGH: 0
ABDOMINAL PAIN: 0
VOMITING: 0
CONSTIPATION: 0
GASTROINTESTINAL NEGATIVE: 1
RESPIRATORY NEGATIVE: 1
SHORTNESS OF BREATH: 0

## 2019-07-31 NOTE — PROGRESS NOTES
Follow-up with clinic in 6 months.  - last seizure episode was in 2011    3. Tobacco use  -cessation counseling provided    Return in about 6 months (around 1/31/2020). An electronic signature was used to authenticate this note. --Jason Martinez MD on 7/85/6233 at 9:03 AM     Addendum to Resident H& P/Progress note:  I have personally seen,examined and evaluated the patient.  I have reviewed the current history, physical findings, labs and assessment and plan and agree with note as documented by resident MD ( Vaughn Vazquez)      Cornel Wu MD, [address]

## 2019-12-11 ENCOUNTER — OFFICE VISIT (OUTPATIENT)
Dept: INTERNAL MEDICINE CLINIC | Age: 44
End: 2019-12-11
Payer: COMMERCIAL

## 2019-12-11 VITALS
OXYGEN SATURATION: 96 % | HEART RATE: 76 BPM | SYSTOLIC BLOOD PRESSURE: 130 MMHG | DIASTOLIC BLOOD PRESSURE: 84 MMHG | BODY MASS INDEX: 26.36 KG/M2 | HEIGHT: 66 IN | TEMPERATURE: 98.7 F | WEIGHT: 164 LBS

## 2019-12-11 DIAGNOSIS — R56.9 SEIZURE (HCC): Primary | ICD-10-CM

## 2019-12-11 DIAGNOSIS — N52.8 OTHER MALE ERECTILE DYSFUNCTION: ICD-10-CM

## 2019-12-11 DIAGNOSIS — E78.5 HYPERLIPIDEMIA LDL GOAL <100: ICD-10-CM

## 2019-12-11 PROCEDURE — 99213 OFFICE O/P EST LOW 20 MIN: CPT | Performed by: STUDENT IN AN ORGANIZED HEALTH CARE EDUCATION/TRAINING PROGRAM

## 2019-12-11 RX ORDER — PHENYTOIN SODIUM 100 MG/1
600 CAPSULE, EXTENDED RELEASE ORAL DAILY
Qty: 180 CAPSULE | Refills: 5 | Status: SHIPPED | OUTPATIENT
Start: 2019-12-11 | End: 2020-07-16

## 2019-12-11 RX ORDER — SILDENAFIL 50 MG/1
100 TABLET, FILM COATED ORAL DAILY PRN
Qty: 10 TABLET | Refills: 0 | Status: SHIPPED | OUTPATIENT
Start: 2019-12-11 | End: 2020-02-18 | Stop reason: SDUPTHER

## 2019-12-11 RX ORDER — ATORVASTATIN CALCIUM 20 MG/1
20 TABLET, FILM COATED ORAL DAILY
Qty: 30 TABLET | Refills: 3 | Status: SHIPPED | OUTPATIENT
Start: 2019-12-11 | End: 2020-12-29 | Stop reason: SDUPTHER

## 2019-12-11 RX ORDER — LEVETIRACETAM 500 MG/1
500 TABLET ORAL DAILY
Qty: 90 TABLET | Refills: 5 | Status: SHIPPED | OUTPATIENT
Start: 2019-12-11 | End: 2020-12-29 | Stop reason: ALTCHOICE

## 2019-12-11 RX ORDER — SILDENAFIL 50 MG/1
50 TABLET, FILM COATED ORAL DAILY PRN
Qty: 10 TABLET | Refills: 0 | Status: SHIPPED | OUTPATIENT
Start: 2019-12-11 | End: 2019-12-11

## 2019-12-11 ASSESSMENT — PATIENT HEALTH QUESTIONNAIRE - PHQ9
SUM OF ALL RESPONSES TO PHQ9 QUESTIONS 1 & 2: 0
2. FEELING DOWN, DEPRESSED OR HOPELESS: 0
1. LITTLE INTEREST OR PLEASURE IN DOING THINGS: 0
SUM OF ALL RESPONSES TO PHQ QUESTIONS 1-9: 0
SUM OF ALL RESPONSES TO PHQ QUESTIONS 1-9: 0

## 2020-02-17 NOTE — TELEPHONE ENCOUNTER
Patient was calling to see if his VIAGRA)    Could be sent over to [facility] on [address]. Because he is almost out.

## 2020-02-18 RX ORDER — SILDENAFIL 50 MG/1
50 TABLET, FILM COATED ORAL DAILY PRN
Qty: 10 TABLET | Refills: 0 | Status: CANCELLED | OUTPATIENT
Start: 2020-02-18

## 2020-02-18 RX ORDER — SILDENAFIL 50 MG/1
100 TABLET, FILM COATED ORAL DAILY PRN
Qty: 10 TABLET | Refills: 0 | Status: SHIPPED | OUTPATIENT
Start: 2020-02-18 | End: 2020-02-18

## 2020-02-18 RX ORDER — SILDENAFIL 50 MG/1
50 TABLET, FILM COATED ORAL DAILY PRN
Qty: 10 TABLET | Refills: 0 | Status: SHIPPED | OUTPATIENT
Start: 2020-02-18 | End: 2020-02-19

## 2020-02-19 RX ORDER — SILDENAFIL 50 MG/1
50 TABLET, FILM COATED ORAL DAILY PRN
Qty: 5 TABLET | Refills: 0 | Status: SHIPPED | OUTPATIENT
Start: 2020-02-19 | End: 2020-03-04 | Stop reason: SDUPTHER

## 2020-03-04 ENCOUNTER — OFFICE VISIT (OUTPATIENT)
Dept: INTERNAL MEDICINE CLINIC | Age: 45
End: 2020-03-04
Payer: COMMERCIAL

## 2020-03-04 VITALS
HEIGHT: 66 IN | WEIGHT: 161 LBS | DIASTOLIC BLOOD PRESSURE: 80 MMHG | HEART RATE: 84 BPM | OXYGEN SATURATION: 97 % | TEMPERATURE: 98.5 F | BODY MASS INDEX: 25.88 KG/M2 | SYSTOLIC BLOOD PRESSURE: 124 MMHG

## 2020-03-04 DIAGNOSIS — Z11.3 SCREENING FOR STD (SEXUALLY TRANSMITTED DISEASE): ICD-10-CM

## 2020-03-04 DIAGNOSIS — Z00.00 HEALTHCARE MAINTENANCE: ICD-10-CM

## 2020-03-04 DIAGNOSIS — R56.9 SEIZURE (HCC): ICD-10-CM

## 2020-03-04 LAB
A/G RATIO: 1.5 (ref 1.1–2.2)
ALBUMIN SERPL-MCNC: 4.5 G/DL (ref 3.4–5)
ALP BLD-CCNC: 124 U/L (ref 40–129)
ALT SERPL-CCNC: 20 U/L (ref 10–40)
ANION GAP SERPL CALCULATED.3IONS-SCNC: 13 MMOL/L (ref 3–16)
AST SERPL-CCNC: 18 U/L (ref 15–37)
BASOPHILS ABSOLUTE: 0 K/UL (ref 0–0.2)
BASOPHILS RELATIVE PERCENT: 0.4 %
BILIRUB SERPL-MCNC: <0.2 MG/DL (ref 0–1)
BUN BLDV-MCNC: 11 MG/DL (ref 7–20)
CALCIUM SERPL-MCNC: 9.2 MG/DL (ref 8.3–10.6)
CHLORIDE BLD-SCNC: 99 MMOL/L (ref 99–110)
CHOLESTEROL, TOTAL: 213 MG/DL (ref 0–199)
CO2: 26 MMOL/L (ref 21–32)
CREAT SERPL-MCNC: 0.7 MG/DL (ref 0.9–1.3)
EOSINOPHILS ABSOLUTE: 0.1 K/UL (ref 0–0.6)
EOSINOPHILS RELATIVE PERCENT: 2 %
GFR AFRICAN AMERICAN: >60
GFR NON-AFRICAN AMERICAN: >60
GLOBULIN: 3 G/DL
GLUCOSE BLD-MCNC: 86 MG/DL (ref 70–99)
HCT VFR BLD CALC: 44.4 % (ref 40.5–52.5)
HDLC SERPL-MCNC: 43 MG/DL (ref 40–60)
HEMOGLOBIN: 15.3 G/DL (ref 13.5–17.5)
HEPATITIS C ANTIBODY INTERPRETATION: NORMAL
LDL CHOLESTEROL CALCULATED: 155 MG/DL
LYMPHOCYTES ABSOLUTE: 1.9 K/UL (ref 1–5.1)
LYMPHOCYTES RELATIVE PERCENT: 28.8 %
MCH RBC QN AUTO: 31.7 PG (ref 26–34)
MCHC RBC AUTO-ENTMCNC: 34.6 G/DL (ref 31–36)
MCV RBC AUTO: 91.5 FL (ref 80–100)
MONOCYTES ABSOLUTE: 0.6 K/UL (ref 0–1.3)
MONOCYTES RELATIVE PERCENT: 9.2 %
NEUTROPHILS ABSOLUTE: 3.9 K/UL (ref 1.7–7.7)
NEUTROPHILS RELATIVE PERCENT: 59.6 %
PDW BLD-RTO: 14.9 % (ref 12.4–15.4)
PLATELET # BLD: 261 K/UL (ref 135–450)
PMV BLD AUTO: 7.8 FL (ref 5–10.5)
POTASSIUM SERPL-SCNC: 4.5 MMOL/L (ref 3.5–5.1)
RBC # BLD: 4.85 M/UL (ref 4.2–5.9)
SODIUM BLD-SCNC: 138 MMOL/L (ref 136–145)
TOTAL PROTEIN: 7.5 G/DL (ref 6.4–8.2)
TRIGL SERPL-MCNC: 74 MG/DL (ref 0–150)
VLDLC SERPL CALC-MCNC: 15 MG/DL
WBC # BLD: 6.6 K/UL (ref 4–11)

## 2020-03-04 PROCEDURE — 99213 OFFICE O/P EST LOW 20 MIN: CPT | Performed by: STUDENT IN AN ORGANIZED HEALTH CARE EDUCATION/TRAINING PROGRAM

## 2020-03-04 RX ORDER — SILDENAFIL 50 MG/1
100 TABLET, FILM COATED ORAL DAILY PRN
Qty: 30 TABLET | Refills: 2 | Status: SHIPPED | OUTPATIENT
Start: 2020-03-04 | End: 2020-12-29 | Stop reason: SDUPTHER

## 2020-03-04 ASSESSMENT — ENCOUNTER SYMPTOMS
SINUS PRESSURE: 0
VOMITING: 0
WHEEZING: 0
SORE THROAT: 0
DIARRHEA: 0
SINUS PAIN: 0
SHORTNESS OF BREATH: 0
CHEST TIGHTNESS: 0
COUGH: 0
COLOR CHANGE: 0
RHINORRHEA: 0
CONSTIPATION: 0
NAUSEA: 0
ABDOMINAL PAIN: 0
ABDOMINAL DISTENTION: 0

## 2020-03-04 ASSESSMENT — PATIENT HEALTH QUESTIONNAIRE - PHQ9
SUM OF ALL RESPONSES TO PHQ9 QUESTIONS 1 & 2: 0
1. LITTLE INTEREST OR PLEASURE IN DOING THINGS: 0
SUM OF ALL RESPONSES TO PHQ QUESTIONS 1-9: 0
SUM OF ALL RESPONSES TO PHQ QUESTIONS 1-9: 0
2. FEELING DOWN, DEPRESSED OR HOPELESS: 0

## 2020-03-04 ASSESSMENT — VISUAL ACUITY: OU: 1

## 2020-03-04 NOTE — PROGRESS NOTES
organization: Not on file     Attends meetings of clubs or organizations: Not on file     Relationship status: Not on file    Intimate partner violence:     Fear of current or ex partner: Not on file     Emotionally abused: Not on file     Physically abused: Not on file     Forced sexual activity: Not on file   Other Topics Concern    Not on file   Social History Narrative    ** Merged History Encounter **             No family history on file. Vitals:    03/04/20 1517   BP: 124/80   Site: Left Upper Arm   Position: Sitting   Cuff Size: Medium Adult   Pulse: 84   Temp: 98.5 °F (36.9 °C)   TempSrc: Oral   SpO2: 97%   Weight: 161 lb (73 kg)   Height: 5' 6\" (1.676 m)     Estimated body mass index is 25.99 kg/m² as calculated from the following:    Height as of this encounter: 5' 6\" (1.676 m). Weight as of this encounter: 161 lb (73 kg). Physical Exam  Constitutional:       General: He is not in acute distress. Appearance: Normal appearance. He is not ill-appearing. HENT:      Head: Normocephalic and atraumatic. Eyes:      General: Vision grossly intact. Conjunctiva/sclera: Conjunctivae normal.   Neck:      Musculoskeletal: Full passive range of motion without pain, normal range of motion and neck supple. Cardiovascular:      Rate and Rhythm: Normal rate and regular rhythm. Pulses: Normal pulses. Heart sounds: Normal heart sounds, S1 normal and S2 normal. No murmur. No friction rub. No gallop. Pulmonary:      Effort: Pulmonary effort is normal. No respiratory distress. Breath sounds: Normal breath sounds and air entry. No wheezing or rales. Abdominal:      General: Bowel sounds are normal. There is no distension. Palpations: Abdomen is soft. Tenderness: There is no abdominal tenderness. Musculoskeletal: Normal range of motion. Right lower leg: No edema. Left lower leg: No edema. Lymphadenopathy:      Cervical: No cervical adenopathy.    Skin:

## 2020-03-04 NOTE — PATIENT INSTRUCTIONS
- Please take all your medication as prescribed  - Please go to the lab and get all your labs done  - Please Visit WebMD and read about Bacterial Vaginosis   - Please call or come and see us in 4 months or earlier if your symptoms continue or worsens. Thank you.

## 2020-03-05 LAB
HIV AG/AB: NORMAL
HIV ANTIGEN: NORMAL
HIV-1 ANTIBODY: NORMAL
HIV-2 AB: NORMAL

## 2020-03-07 DIAGNOSIS — Z11.3 SCREENING FOR STD (SEXUALLY TRANSMITTED DISEASE): ICD-10-CM

## 2020-03-09 LAB
PHENYTOIN % FREE: 7.2 % (ref 8–14)
PHENYTOIN FREE: 0.9 UG/ML (ref 1–2.5)
PHENYTOIN LEVEL: 12.5 UG/ML (ref 10–20)

## 2020-03-10 LAB
C. TRACHOMATIS DNA ,URINE: NEGATIVE
N. GONORRHOEAE DNA, URINE: NEGATIVE

## 2020-04-03 ENCOUNTER — TELEPHONE (OUTPATIENT)
Dept: INTERNAL MEDICINE CLINIC | Age: 45
End: 2020-04-03

## 2020-04-03 NOTE — TELEPHONE ENCOUNTER
Patient would like to speak with a doctor regarding \"a personal issue\" said that it  Was margoth urgent about some recent lab results.

## 2020-04-04 LAB
BILIRUBIN URINE: NEGATIVE
BLOOD, URINE: ABNORMAL
CLARITY: CLEAR
COLOR: YELLOW
EPITHELIAL CELLS, UA: 1 /HPF (ref 0–5)
GLUCOSE URINE: NEGATIVE MG/DL
HYALINE CASTS: 0 /LPF (ref 0–8)
KETONES, URINE: NEGATIVE MG/DL
LEUKOCYTE ESTERASE, URINE: NEGATIVE
MICROSCOPIC EXAMINATION: YES
NITRITE, URINE: NEGATIVE
PH UA: 6.5 (ref 5–8)
PROTEIN UA: NEGATIVE MG/DL
RBC UA: 10 /HPF (ref 0–4)
SPECIFIC GRAVITY UA: 1.02 (ref 1–1.03)
URINE TYPE: ABNORMAL
UROBILINOGEN, URINE: 0.2 E.U./DL
WBC UA: 0 /HPF (ref 0–5)

## 2020-04-05 LAB — URINE CULTURE, ROUTINE: NORMAL

## 2020-04-08 ENCOUNTER — TELEPHONE (OUTPATIENT)
Dept: INTERNAL MEDICINE CLINIC | Age: 45
End: 2020-04-08

## 2020-07-16 RX ORDER — PHENYTOIN SODIUM 100 MG/1
CAPSULE, EXTENDED RELEASE ORAL
Qty: 180 CAPSULE | Refills: 2 | Status: SHIPPED | OUTPATIENT
Start: 2020-07-16 | End: 2020-10-14

## 2020-10-14 RX ORDER — PHENYTOIN SODIUM 100 MG/1
CAPSULE, EXTENDED RELEASE ORAL
Qty: 180 CAPSULE | Refills: 2 | Status: SHIPPED | OUTPATIENT
Start: 2020-10-14 | End: 2020-12-29 | Stop reason: SDUPTHER

## 2020-10-14 NOTE — TELEPHONE ENCOUNTER
LAST OV 03/04/2020  ASIMENG  NEXT OV NONE  LAST RF 07/16/2020 MODE    phenytoin (DILANTIN) 100 MG ER capsule

## 2020-12-29 ENCOUNTER — OFFICE VISIT (OUTPATIENT)
Dept: INTERNAL MEDICINE CLINIC | Age: 45
End: 2020-12-29
Payer: COMMERCIAL

## 2020-12-29 VITALS
HEIGHT: 64 IN | OXYGEN SATURATION: 94 % | WEIGHT: 166.9 LBS | HEART RATE: 85 BPM | BODY MASS INDEX: 28.49 KG/M2 | TEMPERATURE: 97.7 F | DIASTOLIC BLOOD PRESSURE: 81 MMHG | SYSTOLIC BLOOD PRESSURE: 129 MMHG

## 2020-12-29 PROCEDURE — 99213 OFFICE O/P EST LOW 20 MIN: CPT | Performed by: STUDENT IN AN ORGANIZED HEALTH CARE EDUCATION/TRAINING PROGRAM

## 2020-12-29 RX ORDER — FLUTICASONE PROPIONATE 50 MCG
2 SPRAY, SUSPENSION (ML) NASAL DAILY
Qty: 3 BOTTLE | Refills: 1 | Status: SHIPPED | OUTPATIENT
Start: 2020-12-29

## 2020-12-29 RX ORDER — PHENYTOIN SODIUM 100 MG/1
CAPSULE, EXTENDED RELEASE ORAL
Qty: 180 CAPSULE | Refills: 5 | Status: SHIPPED | OUTPATIENT
Start: 2020-12-29 | End: 2021-06-29 | Stop reason: SDUPTHER

## 2020-12-29 RX ORDER — ATORVASTATIN CALCIUM 20 MG/1
20 TABLET, FILM COATED ORAL DAILY
Qty: 30 TABLET | Refills: 5 | Status: SHIPPED | OUTPATIENT
Start: 2020-12-29 | End: 2021-06-29 | Stop reason: SDUPTHER

## 2020-12-29 RX ORDER — KETOROLAC TROMETHAMINE 10 MG/1
10 TABLET, FILM COATED ORAL EVERY 6 HOURS PRN
Qty: 40 TABLET | Refills: 0 | Status: SHIPPED | OUTPATIENT
Start: 2020-12-29 | End: 2022-05-24

## 2020-12-29 RX ORDER — SILDENAFIL 50 MG/1
100 TABLET, FILM COATED ORAL DAILY PRN
Qty: 30 TABLET | Refills: 2 | Status: SHIPPED | OUTPATIENT
Start: 2020-12-29 | End: 2021-06-29 | Stop reason: SDUPTHER

## 2020-12-29 RX ORDER — ATORVASTATIN CALCIUM 20 MG/1
20 TABLET, FILM COATED ORAL DAILY
Qty: 30 TABLET | Refills: 3 | Status: SHIPPED | OUTPATIENT
Start: 2020-12-29 | End: 2020-12-29

## 2020-12-29 RX ORDER — PHENYTOIN SODIUM 100 MG/1
CAPSULE, EXTENDED RELEASE ORAL
Qty: 180 CAPSULE | Refills: 3 | Status: SHIPPED | OUTPATIENT
Start: 2020-12-29 | End: 2020-12-29

## 2020-12-29 NOTE — PATIENT INSTRUCTIONS
your other hand, hold your injured arm (palm outward) behind your back by the wrist. Pull your arm up gently to stretch your shoulder. 3. Advanced stretch: Put a towel over your other shoulder. Put the hand of your injured arm behind your back. Now hold the back end of the towel. With the other hand, hold the front end of the towel in front of your body. Pull gently on the front end of the towel. This will bring your hand farther up your back to stretch your shoulder. Overhead stretch   1. Standing about an arm's length away, grasp onto a solid surface. You could use a countertop, a doorknob, or the back of a sturdy chair. 2. With your knees slightly bent, bend forward with your arms straight. Lower your upper body, and let your shoulders stretch. 3. As your shoulders are able to stretch farther, you may need to take a step or two backward. 4. Hold for at least 15 to 30 seconds. Then stand up and relax. If you had stepped back during your stretch, step forward so you can keep your hands on the solid surface. 5. Repeat 2 to 4 times. Shoulder flexion (lying down)   To make a wand for this exercise, use a piece of PVC pipe or a broom handle with the broom removed. Make the wand about a foot wider than your shoulders. 1. Lie on your back, holding a wand with both hands. Your palms should face down as you hold the wand. 2. Keeping your elbows straight, slowly raise your arms over your head. Raise them until you feel a stretch in your shoulders, upper back, and chest.  3. Hold for 15 to 30 seconds. 4. Repeat 2 to 4 times. Shoulder rotation (lying down)   To make a wand for this exercise, use a piece of PVC pipe or a broom handle with the broom removed. Make the wand about a foot wider than your shoulders. 1. Lie on your back. Hold a wand with both hands with your elbows bent and palms up. 2. Keep your elbows close to your body, and move the wand across your body toward the sore arm.   3. Hold for 8 to 12 seconds. 4. Repeat 2 to 4 times. Wall climbing (to the side)   Avoid any movement that is straight to your side, and be careful not to arch your back. Your arm should stay about 30 degrees to the front of your side. 1. Stand with your side to a wall so that your fingers can just touch it at an angle about 30 degrees toward the front of your body. 2. Walk the fingers of your injured arm up the wall as high as pain permits. Try not to shrug your shoulder up toward your ear as you move your arm up. 3. Hold that position for a count of at least 15 to 20.  4. Walk your fingers back down to the starting position. 5. Repeat at least 2 to 4 times. Try to reach higher each time. Wall climbing (to the front)   During this stretching exercise, be careful not to arch your back. 1. Face a wall, and stand so your fingers can just touch it. 2. Keeping your shoulder down, walk the fingers of your injured arm up the wall as high as pain permits. (Don't shrug your shoulder up toward your ear.)  3. Hold your arm in that position for at least 15 to 30 seconds. 4. Slowly walk your fingers back down to where you started. 5. Repeat at least 2 to 4 times. Try to reach higher each time. Shoulder blade squeeze   1. Stand with your arms at your sides, and squeeze your shoulder blades together. Do not raise your shoulders up as you squeeze. 2. Hold 6 seconds. 3. Repeat 8 to 12 times. Scapular exercise: Arm reach   1. Lie flat on your back. This exercise is a very slight motion that starts with your arms raised (elbows straight, arms straight). 2. From this position, reach higher toward the sandra or ceiling. Keep your elbows straight. All motion should be from your shoulder blade only. 3. Relax your arms back to where you started. 4. Repeat 8 to 12 times. Arm raise to the side   During this strengthening exercise, your arm should stay about 30 degrees to the front of your side.   1. Slowly raise your injured arm to the side, with your thumb facing up. Raise your arm 60 degrees at the most (shoulder level is 90 degrees). 2. Hold the position for 3 to 5 seconds. Then lower your arm back to your side. If you need to, bring your \"good\" arm across your body and place it under the elbow as you lower your injured arm. Use your good arm to keep your injured arm from dropping down too fast.  3. Repeat 8 to 12 times. 4. When you first start out, don't hold any extra weight in your hand. As you get stronger, you may use a 1-pound to 2-pound dumbbell or a small can of food. Shoulder flexor and extensor exercise   These are isometric exercises. That means you contract your muscles without actually moving. 1. Push forward (flex): Stand facing a wall or doorjamb, about 6 inches or less back. Hold your injured arm against your body. Make a closed fist with your thumb on top. Then gently push your hand forward into the wall with about 25% to 50% of your strength. Don't let your body move backward as you push. Hold for about 6 seconds. Relax for a few seconds. Repeat 8 to 12 times. 2. Push backward (extend): Stand with your back flat against a wall. Your upper arm should be against the wall, with your elbow bent 90 degrees (your hand straight ahead). Push your elbow gently back against the wall with about 25% to 50% of your strength. Don't let your body move forward as you push. Hold for about 6 seconds. Relax for a few seconds. Repeat 8 to 12 times. Scapular exercise: Wall push-ups   This exercise is best done with your fingers somewhat turned out, rather than straight up and down. 1. Stand facing a wall, about 12 inches to 18 inches away. 2. Place your hands on the wall at shoulder height. 3. Slowly bend your elbows and bring your face to the wall. Keep your back and hips straight. 4. Push back to where you started. 5. Repeat 8 to 12 times.   6. When you can do this exercise against a wall comfortably, you can try it against a counter. You can then slowly progress to the end of a couch, then to a sturdy chair, and finally to the floor. Scapular exercise: Retraction   For this exercise, you will need elastic exercise material, such as surgical tubing or Thera-Band. 1. Put the band around a solid object at about waist level. (A bedpost will work well.) Each hand should hold an end of the band. 2. With your elbows at your sides and bent to 90 degrees, pull the band back. Your shoulder blades should move toward each other. Then move your arms back where you started. 3. Repeat 8 to 12 times. 4. If you have good range of motion in your shoulders, try this exercise with your arms lifted out to the sides. Keep your elbows at a 90-degree angle. Raise the elastic band up to about shoulder level. Pull the band back to move your shoulder blades toward each other. Then move your arms back where you started. Internal rotator strengthening exercise   1. Start by tying a piece of elastic exercise material to a doorknob. You can use surgical tubing or Thera-Band. 2. Stand or sit with your shoulder relaxed and your elbow bent 90 degrees. Your upper arm should rest comfortably against your side. Squeeze a rolled towel between your elbow and your body for comfort. This will help keep your arm at your side. 3. Hold one end of the elastic band in the hand of the painful arm. 4. Slowly rotate your forearm toward your body until it touches your belly. Slowly move it back to where you started. 5. Keep your elbow and upper arm firmly tucked against the towel roll or at your side. 6. Repeat 8 to 12 times. External rotator strengthening exercise   1. Start by tying a piece of elastic exercise material to a doorknob. You can use surgical tubing or Thera-Band. (You may also hold one end of the band in each hand.)  2. Stand or sit with your shoulder relaxed and your elbow bent 90 degrees. Your upper arm should rest comfortably against your side. Squeeze a rolled towel between your elbow and your body for comfort. This will help keep your arm at your side. 3. Hold one end of the elastic band with the hand of the painful arm. 4. Start with your forearm across your belly. Slowly rotate the forearm out away from your body. Keep your elbow and upper arm tucked against the towel roll or the side of your body until you begin to feel tightness in your shoulder. Slowly move your arm back to where you started. 5. Repeat 8 to 12 times. Follow-up care is a key part of your treatment and safety. Be sure to make and go to all appointments, and call your doctor if you are having problems. It's also a good idea to know your test results and keep a list of the medicines you take. Where can you learn more? Go to [email or web address].[email or web address]. org and sign in to your [employer] ([email or web address]) account. Enter S[facility] Romero in the [facility] box to learn more about \"Rotator Cuff: Exercises. \"     If you do not have an account, please click on the \"Sign Up Now\" link. Current as of: March 2, 2020               Content Version: 12.6  © 9122-1104 [employer]. Care instructions adapted under license by [facility] [place] ([facility] [place]). If you have questions about a medical condition or this instruction, always ask your healthcare professional. [address]  any warranty or liability for your use of this information.

## 2020-12-29 NOTE — PROGRESS NOTES
vaccine (1) 09/01/2020       Immunization History   Administered Date(s) Administered    Pneumococcal Polysaccharide ([id number]) 07/31/2019    Tdap (Boostrix, Adacel) 07/29/2018       Review of Systems  A 10-organ Review Of Systems was obtained and otherwise unremarkable except as per HPI. Data: Old records have been reviewed electronically. PHYSICAL EXAM:  /81 (Site: Left Upper Arm, Position: Sitting, Cuff Size: Medium Adult)   Pulse 85   Temp 97.7 °F (36.5 °C) (Temporal)   Ht 5' 4\" (1.626 m)   Wt 166 lb 14.4 oz (75.7 kg)   SpO2 94%   BMI 28.65 kg/m²   Physical Exam   · General appearance: Appears comfortable at rest, fully alert and orientated    · HEENT: Atraumatic, normocephalic, moist mucus membranes. No tenderness to palpation of sinuses. Bilateral ear canals are free of any cerumen. Tympanic membranes clear  · Respiratory: Normal respiratory effort. No wheezes, rubs, or crackles  · Cardiovascular: regular S1/S2, with no Murmur, rub or gallop. No JVD  · Abdomen: Soft, non-tender, non-distended  · Musculoskeletal: No clubbing, cyanosis, no lower extremity edema, peripheral pulses present, cap refill < 2sec. + impingement sign on Hawkin test of left shoulder. Able to perform full active ROM and abduction of bilateral arms to past the head. Peripheral pulses intact. · Neurologic: Neurovascularly grossly intact without any focal motor deficits. Cranial nerves:  grossly non-focal.    Assessment & Plan:    40 yo M with PMHx seizure disorder here for sinus issues. 1. Seizure ([address]  - phenytoin (DILANTIN) 100 MG ER capsule; TAKE 6 CAPSULES BY MOUTH EVERY DAY  Dispense: 180 capsule; Refill: 5  -BMV paperwork filled out. OK to drive as patient has been seizure free since 2018  -keppra removed from med list as patient no longer taking. Seizures well controlled on dilantin. 2. Hyperlipidemia LDL goal <100  - atorvastatin (LIPITOR) 20 MG tablet;  Take 1 tablet by mouth daily  Dispense: 30 tablet; Refill: 5    3. Other male erectile dysfunction  - sildenafil (VIAGRA) 50 MG tablet; Take 2 tablets by mouth daily as needed for Erectile Dysfunction (Take 1 hr prior to sexual activity) OK to substitute for generic brand if Viagara too expensive  Dispense: 30 tablet; Refill: 2    4. Acute maxillary sinusitis, recurrence not specified  - advised on smoking cessation. Notified to call should he develop pain or pressure and/or fever, which may qualify him for antibiotics  - fluticasone (FLONASE) 50 MCG/ACT nasal spray; 2 sprays by Each Nostril route daily  Dispense: 3 Bottle; Refill: 1    5. Sprain of left  shoulder, unspecified shoulder sprain type, initial encounter  - reviewed shoulder exercises with patient   - diclofenac sodium (VOLTAREN) 1 % GEL; Apply 2 g topically 4 times daily as needed for Pain  Dispense: 30 g; Refill: 0  - ketorolac (TORADOL) 10 MG tablet; Take 1 tablet by mouth every 6 hours as needed for Pain  Dispense: 40 tablet; Refill: 0      Return in about 6 months (around 6/29/2021).     Dispo: Pt has been staffed with Dr. Elyssa Angulo  _______________  Amrita Mart MD, [id number] 3:36 PM   PGY-3

## 2021-01-19 NOTE — PROGRESS NOTES
Reassessment:   78yFemalePatient is a 78y old  Female who presents with a chief complaint of foot ulcer (2021 11:43)      Factors impacting intake: [ ] none [ ] nausea  [ ] vomiting [ ] diarrhea [ ] constipation  [ ]chewing problems [ ] swallowing issues  [ X] other:     Diet Presciption: Diet, Dysphagia 1 Pureed-Thin Liquids:   Supplement Feeding Modality:  Oral  Ensure Muscle Health Cans or Servings Per Day:  2       Frequency:  Two Times a day (21 @ 22:45)    Intake:     Daily Weight in k.2 (2021 05:15)    % Weight Change    Pertinent Medications: MEDICATIONS  (STANDING):  aspirin enteric coated 81 milliGRAM(s) Oral daily  cefepime   IVPB 1000 milliGRAM(s) IV Intermittent every 8 hours  collagenase Ointment 1 Application(s) Topical daily  Dakins Solution - Full Strength 1 Application(s) Topical once  dextrose 40% Gel 15 Gram(s) Oral once  dextrose 5% + sodium chloride 0.9% with potassium chloride 20 mEq/L 1000 milliLiter(s) (75 mL/Hr) IV Continuous <Continuous>  dextrose 50% Injectable 25 Gram(s) IV Push once  dextrose 50% Injectable 12.5 Gram(s) IV Push once  dextrose 50% Injectable 25 Gram(s) IV Push once  enoxaparin Injectable 40 milliGRAM(s) SubCutaneous daily  glucagon  Injectable 1 milliGRAM(s) IntraMuscular once  insulin lispro (ADMELOG) corrective regimen sliding scale   SubCutaneous three times a day before meals  insulin lispro (ADMELOG) corrective regimen sliding scale   SubCutaneous at bedtime  metroNIDAZOLE  IVPB 500 milliGRAM(s) IV Intermittent every 8 hours  simvastatin 40 milliGRAM(s) Oral at bedtime  vancomycin  IVPB 750 milliGRAM(s) IV Intermittent every 12 hours    MEDICATIONS  (PRN):  acetaminophen   Tablet .. 650 milliGRAM(s) Oral every 6 hours PRN Moderate Pain (4 - 6)    Pertinent Labs:  Na144 mmol/L Glu 198 mg/dL<H> K+ 4.5 mmol/L Cr  0.55 mg/dL BUN 10 mg/dL  Phos 2.0 mg/dL<L>  Alb 1.3 g/dL<L>  Chol 121 mg/dL LDL --    HDL 36 mg/dL<L> Trig 112 mg/dL     CAPILLARY BLOOD GLUCOSE      POCT Blood Glucose.: 169 mg/dL (2021 11:45)  POCT Blood Glucose.: 195 mg/dL (2021 07:45)  POCT Blood Glucose.: 155 mg/dL (2021 21:18)  POCT Blood Glucose.: 119 mg/dL (2021 16:52)    Skin:     Estimated Needs:   [ ] no change since previous assessment  [ ] recalculated:     Previous Nutrition Diagnosis:   [ ] Inadequate Energy Intake [ ]Inadequate Oral Intake [ ] Excessive Energy Intake   [ ] Underweight [ ] Increased Nutrient Needs [ ] Overweight/Obesity   [ ] Altered GI Function [ ] Unintended Weight Loss [ ] Food & Nutrition Related Knowledge Deficit [ ] Malnutrition     Nutrition Diagnosis is [ ] ongoing  [ ] resolved [ ] not applicable     New Nutrition Diagnosis: [ ] not applicable       Interventions:   Recommend  [ ] Change Diet To:  [ ] Nutrition Supplement  [ ] Nutrition Support  [ ] Other:     Monitoring and Evaluation:   [ ] PO intake [ x ] Tolerance to diet prescription [ x ] weights [ x ] labs[ x ] follow up per protocol  [ ] other: Unable to leave message, VM is full Reassessment: Nutrition re-consult ordered for Assessment on 21  78yFemalePatient is a 78y old  Female who presents with a chief complaint of foot ulcer (2021 11:43)      Factors impacting intake: [ ] none [ ] nausea  [ ] vomiting [ ] diarrhea [ ] constipation  [X ]chewing problems [ X] swallowing issues  [ X] other: osteomyelitis,  paranoia schizophrenia, PAD, HTN, sacral decubitus ulcer     Diet Presciption: Diet, Dysphagia 1 Pureed-Thin Liquids:   Supplement Feeding Modality:  Oral  Ensure Muscle Health Cans or Servings Per Day:  2       Frequency:  Two Times a day (21 @ 22:45)    Intake: Visited pt. alert but very weak, RN at bedside feeding pt. reports of ongoing poor oral intake >1wk, consumed <25% of breakfast meal today, seen by Speech/Swallow team on 21 & recommendation noted, ongoing wound care, s/p debridement of pressure ulcers on  & on IV abx. & bedside debridement of rt. leg ulcer w/partial calcanectomy on , Podiatry team following, Seen by Psych team, rec. Add Glucerna Shake 1can bid as medically feasible (440kcal, 20g protein) & MVI/minerals/Vit. C 500mg BID for wound healing & Zinc Sulfate 220mg once daily as needed, or may consider alternate nutrition support if pt. with persistent poor oral intake as medically feasible. RD available.        Daily Weight in k.2 (2021 05:15)    % Weight Change:  ~2% wt. loss since admission     Pertinent Medications: MEDICATIONS  (STANDING):  aspirin enteric coated 81 milliGRAM(s) Oral daily  cefepime   IVPB 1000 milliGRAM(s) IV Intermittent every 8 hours  collagenase Ointment 1 Application(s) Topical daily  Dakins Solution - Full Strength 1 Application(s) Topical once  dextrose 40% Gel 15 Gram(s) Oral once  dextrose 5% + sodium chloride 0.9% with potassium chloride 20 mEq/L 1000 milliLiter(s) (75 mL/Hr) IV Continuous <Continuous>  dextrose 50% Injectable 25 Gram(s) IV Push once  dextrose 50% Injectable 12.5 Gram(s) IV Push once  dextrose 50% Injectable 25 Gram(s) IV Push once  enoxaparin Injectable 40 milliGRAM(s) SubCutaneous daily  glucagon  Injectable 1 milliGRAM(s) IntraMuscular once  insulin lispro (ADMELOG) corrective regimen sliding scale   SubCutaneous three times a day before meals  insulin lispro (ADMELOG) corrective regimen sliding scale   SubCutaneous at bedtime  metroNIDAZOLE  IVPB 500 milliGRAM(s) IV Intermittent every 8 hours  simvastatin 40 milliGRAM(s) Oral at bedtime  vancomycin  IVPB 750 milliGRAM(s) IV Intermittent every 12 hours    MEDICATIONS  (PRN):  acetaminophen   Tablet .. 650 milliGRAM(s) Oral every 6 hours PRN Moderate Pain (4 - 6)    Pertinent Labs:  Na144 mmol/L Glu 198 mg/dL<H> K+ 4.5 mmol/L Cr  0.55 mg/dL BUN 10 mg/dL  Phos 2.0 mg/dL<L>  Alb 1.3 g/dL<L>  Chol 121 mg/dL LDL --    HDL 36 mg/dL<L> Trig 112 mg/dL     CAPILLARY BLOOD GLUCOSE      POCT Blood Glucose.: 169 mg/dL (2021 11:45)  POCT Blood Glucose.: 195 mg/dL (2021 07:45)  POCT Blood Glucose.: 155 mg/dL (2021 21:18)  POCT Blood Glucose.: 119 mg/dL (2021 16:52)    Skin: multiple pressure ulcer with b/l foot wound care     Estimated Needs:   [x ] no change since previous assessment  [ ] recalculated:       New Nutrition Diagnosis: [YES ]   Severe Protein Calorie Malnutrition in Chronic Illness   Paranoid schizophrenia, diabetes mellitus with multiple comorbidities & advance age     Poor oral intake >1week with <25% intake, weakness, seen Speech/Swallow, on texture modified meals, s/p multiple debridement on going wound care                                         Interventions: To meet nutrition needs   Recommend  [ ] Change Diet To:  [ ] Nutrition Supplement  [X ] Nutrition Support: if pt. with persistent poor oral intake consider alternate nutrition support as medically feasible    [ X] Other: Nursing to continue feeding assistance and encouragement, aspiration precaution     Monitoring and Evaluation:   [X ] PO intake [ x ] Tolerance to diet prescription [ x ] weights [ x ] labs[ x ] follow up per protocol  [ ] other: Reassessment: Nutrition re-consult ordered for Assessment on 21  78yFemalePatient is a 78y old  Female who presents with a chief complaint of foot ulcer (2021 11:43)      Factors impacting intake: [ ] none [ ] nausea  [ ] vomiting [ ] diarrhea [ ] constipation  [X ]chewing problems [ X] swallowing issues  [ X] other: osteomyelitis,  paranoia schizophrenia, PAD, HTN, sacral decubitus ulcer     Diet Presciption: Diet, Dysphagia 1 Pureed-Thin Liquids:   Supplement Feeding Modality:  Oral  Ensure Muscle Health Cans or Servings Per Day:  2       Frequency:  Two Times a day (21 @ 22:45)    Intake: Visited pt. alert but very weak, RN at bedside feeding pt. reports of ongoing poor oral intake >1wk, consumed <25% of breakfast meal today, seen by Speech/Swallow team on 21 & recommendation noted, ongoing wound care, s/p debridement of pressure ulcers on  & on IV abx. & bedside debridement of rt. leg ulcer w/partial calcanectomy on , Podiatry team following, Seen by Psych team, rec. Add Glucerna Shake 1can bid as medically feasible (440kcal, 20g protein) & MVI/minerals/Vit. C 500mg BID for wound healing & Zinc Sulfate 220mg once daily as needed, or may consider alternate nutrition support if pt. with persistent poor oral intake as medically feasible, d/w Nursing to continue feeding assistance and encouragement, aspiration precaution. RD available.        Daily Weight in k.2 (2021 05:15)    % Weight Change:  ~2% wt. loss since admission     Pertinent Medications: MEDICATIONS  (STANDING):  aspirin enteric coated 81 milliGRAM(s) Oral daily  cefepime   IVPB 1000 milliGRAM(s) IV Intermittent every 8 hours  collagenase Ointment 1 Application(s) Topical daily  Dakins Solution - Full Strength 1 Application(s) Topical once  dextrose 40% Gel 15 Gram(s) Oral once  dextrose 5% + sodium chloride 0.9% with potassium chloride 20 mEq/L 1000 milliLiter(s) (75 mL/Hr) IV Continuous <Continuous>  dextrose 50% Injectable 25 Gram(s) IV Push once  dextrose 50% Injectable 12.5 Gram(s) IV Push once  dextrose 50% Injectable 25 Gram(s) IV Push once  enoxaparin Injectable 40 milliGRAM(s) SubCutaneous daily  glucagon  Injectable 1 milliGRAM(s) IntraMuscular once  insulin lispro (ADMELOG) corrective regimen sliding scale   SubCutaneous three times a day before meals  insulin lispro (ADMELOG) corrective regimen sliding scale   SubCutaneous at bedtime  metroNIDAZOLE  IVPB 500 milliGRAM(s) IV Intermittent every 8 hours  simvastatin 40 milliGRAM(s) Oral at bedtime  vancomycin  IVPB 750 milliGRAM(s) IV Intermittent every 12 hours    MEDICATIONS  (PRN):  acetaminophen   Tablet .. 650 milliGRAM(s) Oral every 6 hours PRN Moderate Pain (4 - 6)    Pertinent Labs:  Na144 mmol/L Glu 198 mg/dL<H> K+ 4.5 mmol/L Cr  0.55 mg/dL BUN 10 mg/dL  Phos 2.0 mg/dL<L>  Alb 1.3 g/dL<L>  Chol 121 mg/dL LDL --    HDL 36 mg/dL<L> Trig 112 mg/dL     CAPILLARY BLOOD GLUCOSE      POCT Blood Glucose.: 169 mg/dL (2021 11:45)  POCT Blood Glucose.: 195 mg/dL (2021 07:45)  POCT Blood Glucose.: 155 mg/dL (2021 21:18)  POCT Blood Glucose.: 119 mg/dL (2021 16:52)    Skin: multiple pressure ulcer with b/l foot wound care     Estimated Needs:   [x ] no change since previous assessment  [ ] recalculated:       New Nutrition Diagnosis: [YES ]   Severe Protein Calorie Malnutrition in Chronic Illness   Paranoid schizophrenia, diabetes mellitus with multiple comorbidities & advance age     Poor oral intake >1week with <25% intake, weakness, seen Speech/Swallow, on texture modified meals, s/p multiple debridement on going wound care                                         Interventions: To meet nutrition needs   Recommend  [ ] Change Diet To:  [ ] Nutrition Supplement  [X ] Nutrition Support: if pt. with persistent poor oral intake consider alternate nutrition support as medically feasible    [ X] Other: Nursing to continue feeding assistance and encouragement, aspiration precaution     Monitoring and Evaluation:   [X ] PO intake [ x ] Tolerance to diet prescription [ x ] weights [ x ] labs[ x ] follow up per protocol  [ ] other: Reassessment: Nutrition re-consult ordered for Assessment on 21  78yFemalePatient is a 78y old  Female who presents with a chief complaint of foot ulcer (2021 11:43)      Factors impacting intake: [ ] none [ ] nausea  [ ] vomiting [ ] diarrhea [ ] constipation  [X ]chewing problems [ X] swallowing issues  [ X] other: osteomyelitis,  paranoia schizophrenia, PAD, HTN, sacral decubitus ulcer     Diet Presciption: Diet, Dysphagia 1 Pureed-Thin Liquids:   Supplement Feeding Modality:  Oral  Ensure Muscle Health Cans or Servings Per Day:  2       Frequency:  Two Times a day (21 @ 22:45)    Intake: Visited pt. alert but very weak, RN at bedside feeding pt. reports of ongoing poor oral intake >1wk, consumed <25% of breakfast meal today, seen by Speech/Swallow team on 21 & recommendation noted, ongoing wound care, s/p debridement of pressure ulcers on  & on IV abx. & bedside debridement of rt. leg ulcer w/partial calcanectomy on , Podiatry team following, Seen by Psych team, rec. Add Glucerna Shake 1can bid as medically feasible (440kcal, 20g protein) & MVI/minerals/Vit. C 500mg BID for wound healing & Zinc Sulfate 220mg once daily as needed, or may consider alternate nutrition support if pt. with persistent poor oral intake as medically feasible, d/w Nursing to continue feeding assistance and encouragement, aspiration precaution. RD available.        Daily Weight in k.2 (2021 05:15)    % Weight Change:  ~2% wt. loss since admission     Pertinent Medications: MEDICATIONS  (STANDING):  aspirin enteric coated 81 milliGRAM(s) Oral daily  cefepime   IVPB 1000 milliGRAM(s) IV Intermittent every 8 hours  collagenase Ointment 1 Application(s) Topical daily  Dakins Solution - Full Strength 1 Application(s) Topical once  dextrose 40% Gel 15 Gram(s) Oral once  dextrose 5% + sodium chloride 0.9% with potassium chloride 20 mEq/L 1000 milliLiter(s) (75 mL/Hr) IV Continuous <Continuous>  dextrose 50% Injectable 25 Gram(s) IV Push once  dextrose 50% Injectable 12.5 Gram(s) IV Push once  dextrose 50% Injectable 25 Gram(s) IV Push once  enoxaparin Injectable 40 milliGRAM(s) SubCutaneous daily  glucagon  Injectable 1 milliGRAM(s) IntraMuscular once  insulin lispro (ADMELOG) corrective regimen sliding scale   SubCutaneous three times a day before meals  insulin lispro (ADMELOG) corrective regimen sliding scale   SubCutaneous at bedtime  metroNIDAZOLE  IVPB 500 milliGRAM(s) IV Intermittent every 8 hours  simvastatin 40 milliGRAM(s) Oral at bedtime  vancomycin  IVPB 750 milliGRAM(s) IV Intermittent every 12 hours    MEDICATIONS  (PRN):  acetaminophen   Tablet .. 650 milliGRAM(s) Oral every 6 hours PRN Moderate Pain (4 - 6)    Pertinent Labs:  Na144 mmol/L Glu 198 mg/dL<H> K+ 4.5 mmol/L Cr  0.55 mg/dL BUN 10 mg/dL  Phos 2.0 mg/dL<L>  Alb 1.3 g/dL<L>  Chol 121 mg/dL LDL --    HDL 36 mg/dL<L> Trig 112 mg/dL     CAPILLARY BLOOD GLUCOSE      POCT Blood Glucose.: 169 mg/dL (2021 11:45)  POCT Blood Glucose.: 195 mg/dL (2021 07:45)  POCT Blood Glucose.: 155 mg/dL (2021 21:18)  POCT Blood Glucose.: 119 mg/dL (2021 16:52)    Skin: multiple pressure ulcer with b/l foot wound care     Estimated Needs:   [x ] no change since previous assessment  [ ] recalculated:       New Nutrition Diagnosis: [YES ]   Severe Protein Calorie Malnutrition in Chronic Illness   Paranoid schizophrenia, diabetes mellitus with multiple comorbidities & advance age     Poor oral intake >1week with <25% intake, weakness, seen Speech/Swallow, on texture modified meals, s/p multiple debridement with ongoing wound care                                         Interventions: To meet nutrition needs   Recommend  [ ] Change Diet To:  [ ] Nutrition Supplement  [X ] Nutrition Support: if pt. with persistent poor oral intake consider alternate nutrition support as medically feasible    [ X] Other: Nursing to continue feeding assistance and encouragement, aspiration precaution     Monitoring and Evaluation:   [X ] PO intake [ x ] Tolerance to diet prescription [ x ] weights [ x ] labs[ x ] follow up per protocol  [ ] other: Reassessment: Nutrition re-consult ordered for Assessment on 21  78yFemalePatient is a 78y old  Female who presents with a chief complaint of foot ulcer (2021 11:43)      Factors impacting intake: [ ] none [ ] nausea  [ ] vomiting [ ] diarrhea [ ] constipation  [X ]chewing problems [ X] swallowing issues  [ X] other: osteomyelitis,  paranoia schizophrenia, PAD, HTN, sacral decubitus ulcer     Diet Presciption: Diet, Dysphagia 1 Pureed-Thin Liquids:   Supplement Feeding Modality:  Oral  Ensure Muscle Health Cans or Servings Per Day:  2       Frequency:  Two Times a day (21 @ 22:45)    Intake: Visited pt. alert but very weak, RN at bedside feeding pt. reports of ongoing poor oral intake >1wk, consumed <25% of breakfast meal today, seen by Speech/Swallow team on 21 & recommendation noted, ongoing wound care, s/p debridement of pressure ulcers on  & on IV abx. & bedside debridement of rt. leg ulcer w/partial calcanectomy on , Podiatry team following, Seen by Psych team, rec. Add Glucerna Shake 1can bid as medically feasible (440kcal, 20g protein) & MVI/minerals/Vit. C 500mg BID for wound healing & Zinc Sulfate 220mg once daily as needed, or may consider alternate nutrition support if pt. with persistent poor oral intake as medically feasible, d/w Nursing to continue feeding assistance and encouragement, aspiration precaution. RD available.        Daily Weight in k.2 (2021 05:15)    % Weight Change:  ~2% wt. loss since admission     Pertinent Medications: MEDICATIONS  (STANDING):  aspirin enteric coated 81 milliGRAM(s) Oral daily  cefepime   IVPB 1000 milliGRAM(s) IV Intermittent every 8 hours  collagenase Ointment 1 Application(s) Topical daily  Dakins Solution - Full Strength 1 Application(s) Topical once  dextrose 40% Gel 15 Gram(s) Oral once  dextrose 5% + sodium chloride 0.9% with potassium chloride 20 mEq/L 1000 milliLiter(s) (75 mL/Hr) IV Continuous <Continuous>  dextrose 50% Injectable 25 Gram(s) IV Push once  dextrose 50% Injectable 12.5 Gram(s) IV Push once  dextrose 50% Injectable 25 Gram(s) IV Push once  enoxaparin Injectable 40 milliGRAM(s) SubCutaneous daily  glucagon  Injectable 1 milliGRAM(s) IntraMuscular once  insulin lispro (ADMELOG) corrective regimen sliding scale   SubCutaneous three times a day before meals  insulin lispro (ADMELOG) corrective regimen sliding scale   SubCutaneous at bedtime  metroNIDAZOLE  IVPB 500 milliGRAM(s) IV Intermittent every 8 hours  simvastatin 40 milliGRAM(s) Oral at bedtime  vancomycin  IVPB 750 milliGRAM(s) IV Intermittent every 12 hours    MEDICATIONS  (PRN):  acetaminophen   Tablet .. 650 milliGRAM(s) Oral every 6 hours PRN Moderate Pain (4 - 6)    Pertinent Labs:  Na144 mmol/L Glu 198 mg/dL<H> K+ 4.5 mmol/L Cr  0.55 mg/dL BUN 10 mg/dL  Phos 2.0 mg/dL<L>  Alb 1.3 g/dL<L>  Chol 121 mg/dL LDL --    HDL 36 mg/dL<L> Trig 112 mg/dL     CAPILLARY BLOOD GLUCOSE      POCT Blood Glucose.: 169 mg/dL (2021 11:45)  POCT Blood Glucose.: 195 mg/dL (2021 07:45)  POCT Blood Glucose.: 155 mg/dL (2021 21:18)  POCT Blood Glucose.: 119 mg/dL (2021 16:52)    Skin: multiple pressure ulcer with b/l foot wound care     Estimated Needs:   [x ] no change since previous assessment  [ ] recalculated:       New Nutrition Diagnosis: [YES ]   Severe Protein Calorie Malnutrition in Chronic Illness   Paranoid schizophrenia, diabetes mellitus with multiple comorbidities & advance age     Poor oral intake >1week w/~2% wt. loss, <25% intake, weakness, muscle loss, seen Speech/Swallow & on texture modified meals, s/p multiple debridement with ongoing wound care                                         Interventions: To meet nutrition needs   Recommend  [ ] Change Diet To:  [ ] Nutrition Supplement  [X ] Nutrition Support: if pt. with persistent poor oral intake consider alternate nutrition support as medically feasible    [ X] Other: Nursing to continue feeding assistance and encouragement, aspiration precaution     Monitoring and Evaluation:   [X ] PO intake [ x ] Tolerance to diet prescription [ x ] weights [ x ] labs[ x ] follow up per protocol  [ ] other:

## 2021-06-29 ENCOUNTER — OFFICE VISIT (OUTPATIENT)
Dept: INTERNAL MEDICINE CLINIC | Age: 46
End: 2021-06-29
Payer: COMMERCIAL

## 2021-06-29 DIAGNOSIS — E78.5 HYPERLIPIDEMIA LDL GOAL <100: ICD-10-CM

## 2021-06-29 DIAGNOSIS — N52.8 OTHER MALE ERECTILE DYSFUNCTION: ICD-10-CM

## 2021-06-29 DIAGNOSIS — Z13.220 LIPID SCREENING: Primary | ICD-10-CM

## 2021-06-29 DIAGNOSIS — F17.200 SMOKING: ICD-10-CM

## 2021-06-29 DIAGNOSIS — R56.9 SEIZURE (HCC): ICD-10-CM

## 2021-06-29 PROCEDURE — 99213 OFFICE O/P EST LOW 20 MIN: CPT | Performed by: SURGERY

## 2021-06-29 RX ORDER — PHENYTOIN SODIUM 100 MG/1
CAPSULE, EXTENDED RELEASE ORAL
Qty: 180 CAPSULE | Refills: 5 | Status: SHIPPED | OUTPATIENT
Start: 2021-06-29 | End: 2021-11-09

## 2021-06-29 RX ORDER — SILDENAFIL 50 MG/1
100 TABLET, FILM COATED ORAL DAILY PRN
Qty: 30 TABLET | Refills: 2 | Status: SHIPPED | OUTPATIENT
Start: 2021-06-29 | End: 2021-12-07 | Stop reason: SDUPTHER

## 2021-06-29 RX ORDER — ATORVASTATIN CALCIUM 20 MG/1
20 TABLET, FILM COATED ORAL DAILY
Qty: 30 TABLET | Refills: 5 | Status: SHIPPED | OUTPATIENT
Start: 2021-06-29 | End: 2021-12-07 | Stop reason: SDUPTHER

## 2021-06-29 ASSESSMENT — PATIENT HEALTH QUESTIONNAIRE - PHQ9
SUM OF ALL RESPONSES TO PHQ QUESTIONS 1-9: 0
SUM OF ALL RESPONSES TO PHQ QUESTIONS 1-9: 0
2. FEELING DOWN, DEPRESSED OR HOPELESS: 0
SUM OF ALL RESPONSES TO PHQ QUESTIONS 1-9: 0
1. LITTLE INTEREST OR PLEASURE IN DOING THINGS: 0
SUM OF ALL RESPONSES TO PHQ9 QUESTIONS 1 & 2: 0

## 2021-06-29 NOTE — PATIENT INSTRUCTIONS
Please think about smoking cessation. You don't have to quit now, but it's important that you contemplate what smoking does for you and if it is worth the detrimental health effects that it will certainly cause you in the future. You are otherwise in fairly good health with the exception of cholesterol elevation. Have you cholesterol checked in a month. Try to institute some lifestyle changes including adherence to a low carb diet. I would like to see a 10 pound weight loss before your next visit with us. Come back and see us in 6 months.

## 2021-06-29 NOTE — PROGRESS NOTES
2021    Alexei Saha (:  1975) is a 39 y.o. male, here for a preventive medicine evaluation. He has hx of HLD, ED, and seizure disorder (no seizures for several years, on Dilantin). States neurologist said he no longer needs to regularly follow. He still requires annual clearance to keep his 's license. Will be due for clearance in December. Continues to smoke 1 ppd, not interested in quitting at this time. Advised to contemplate smoking cessation in the mean time. Patient Active Problem List   Diagnosis    Seizure ([facility]    Smoking       Review of Systems  13 point ROS negative  Prior to Visit Medications    Medication Sig Taking?  Authorizing Provider   sildenafil (VIAGRA) 50 MG tablet Take 2 tablets by mouth daily as needed for Erectile Dysfunction (Take 1 hr prior to sexual activity) OK to substitute for generic brand if Viagara too expensive Yes Marva Briggs MD   atorvastatin (LIPITOR) 20 MG tablet Take 1 tablet by mouth daily Yes Marva Briggs MD   phenytoin (DILANTIN) 100 MG ER capsule TAKE 6 CAPSULES BY MOUTH EVERY DAY Yes Marva Briggs MD   fluticasone (FLONASE) 50 MCG/ACT nasal spray 2 sprays by Each Nostril route daily  Versa MD Alxe   diclofenac sodium (VOLTAREN) 1 % GEL Apply 2 g topically 4 times daily as needed for Pain  Versa MD Alex   ketorolac (TORADOL) 10 MG tablet Take 1 tablet by mouth every 6 hours as needed for Pain  Versa MD Alex        No Known Allergies    Past Medical History:   Diagnosis Date    Seizures ([facility]        Past Surgical History:   Procedure Laterality Date    LASIK  2009       Social History     Socioeconomic History    Marital status: Unknown     Spouse name: Not on file    Number of children: 2    Years of education: Not on file    Highest education level: Not on file   Occupational History    Occupation:    Tobacco Use    Smoking status: Current Every Day Smoker     Packs/day: 0.50     Types: Cigarettes    Smokeless tobacco: Former User   Substance and Sexual Activity    Alcohol use: No    Drug use: No    Sexual activity: Not on file   Other Topics Concern    Not on file   Social History Narrative    ** Merged History Encounter **          Social Determinants of Health     Financial Resource Strain:     Difficulty of Paying Living Expenses:    Food Insecurity:     Worried About Running Out of Food in the Last Year:     Ran Out of Food in the Last Year:    Transportation Needs:     Lack of Transportation (Medical):  Lack of Transportation (Non-Medical):    Physical Activity:     Days of Exercise per Week:     Minutes of Exercise per Session:    Stress:     Feeling of Stress :    Social Connections:     Frequency of Communication with Friends and Family:     Frequency of Social Gatherings with Friends and Family:     Attends Hoahaoism Services:     Active Member of Clubs or Organizations:     Attends Club or Organization Meetings:     Marital Status:    Intimate Partner Violence:     Fear of Current or Ex-Partner:     Emotionally Abused:     Physically Abused:     Sexually Abused:         No family history on file. ADVANCE DIRECTIVE: N, <no information>    There were no vitals filed for this visit. Estimated body mass index is 28.65 kg/m² as calculated from the following:    Height as of 12/29/20: 5' 4\" (1.626 m). Weight as of 12/29/20: 166 lb 14.4 oz (75.7 kg). Physical Exam  Constitutional:       Appearance: Normal appearance. HENT:      Head: Normocephalic and atraumatic. Nose: Nose normal.      Mouth/Throat:      Mouth: Mucous membranes are moist.      Pharynx: Oropharynx is clear. Eyes:      Extraocular Movements: Extraocular movements intact. Conjunctiva/sclera: Conjunctivae normal.      Pupils: Pupils are equal, round, and reactive to light. Cardiovascular:      Rate and Rhythm: Normal rate and regular rhythm. Pulses: Normal pulses.       Heart sounds: Normal heart sounds. Pulmonary:      Effort: Pulmonary effort is normal.      Breath sounds: Normal breath sounds. Abdominal:      General: Abdomen is flat. Bowel sounds are normal.      Palpations: Abdomen is soft. Musculoskeletal:         General: Normal range of motion. Cervical back: Normal range of motion and neck supple. Right lower leg: No edema. Left lower leg: No edema. Skin:     General: Skin is warm and dry. Capillary Refill: Capillary refill takes less than 2 seconds. Neurological:      General: No focal deficit present. Mental Status: He is alert and oriented to person, place, and time. Mental status is at baseline. Psychiatric:         Mood and Affect: Mood normal.         Behavior: Behavior normal.         Thought Content: Thought content normal.         Judgment: Judgment normal.         No flowsheet data found.     Lab Results   Component Value Date    CHOL 213 03/04/2020    TRIG 74 03/04/2020    HDL 43 03/04/2020    LDLCALC 155 03/04/2020    GLUCOSE 86 03/04/2020       The 10-year ASCVD risk score (Payton Simon et al., 2013) is: 6.5%    Values used to calculate the score:      Age: 39 years      Sex: Male      Is Non- : No      Diabetic: No      Tobacco smoker: Yes      Systolic Blood Pressure: 524 mmHg      Is BP treated: No      HDL Cholesterol: 43 mg/dL      Total Cholesterol: 213 mg/dL    Immunization History   Administered Date(s) Administered    Pneumococcal Polysaccharide ([id number]) 07/31/2019    Tdap (Boostrix, Adacel) 07/29/2018       Health Maintenance   Topic Date Due    COVID-19 Vaccine (1) Never done    Lipid screen  03/04/2021    Flu vaccine (Season Ended) 09/01/2021    DTaP/Tdap/Td vaccine (3 - Td or Tdap) 07/29/2028    Pneumococcal 0-64 years Vaccine (2 of 2 - PPSV23) 11/16/2042    Hepatitis C screen  Completed    HIV screen  Completed    Hepatitis A vaccine  Aged Out    Hepatitis B vaccine  Aged Out    Hib vaccine Aged Out    Meningococcal (ACWY) vaccine  Aged Out          ASSESSMENT/PLAN:  1. Lipid screening  -     Lipid, Fasting; Future    2. Smoking  - not interested in cessation at this time  - appraised of short and long term health risks  - advised to contemplate cessation in the mean time    3. Other male erectile dysfunction  -     sildenafil (VIAGRA) 50 MG tablet; Take 2 tablets by mouth daily as needed for Erectile Dysfunction (Take 1 hr prior to sexual activity) OK to substitute for generic brand if Viagara too expensive, Disp-30 tablet, R-2Print    4. Hyperlipidemia LDL goal <100  -     atorvastatin (LIPITOR) 20 MG tablet; Take 1 tablet by mouth daily, Disp-30 tablet, R-5Print    5. Seizure ([address]  -     phenytoin (DILANTIN) 100 MG ER capsule; TAKE 6 CAPSULES BY MOUTH EVERY DAY, Disp-180 capsule, R-5Print      Return in about 6 months (around 12/29/2021). An electronic signature was used to authenticate this note.     --Julien Faustin MD on 6/29/2021 at 3:34 PM

## 2021-09-13 ENCOUNTER — HOSPITAL ENCOUNTER (EMERGENCY)
Age: 46
Discharge: HOME | End: 2021-09-13
Payer: COMMERCIAL

## 2021-09-13 ENCOUNTER — APPOINTMENT (OUTPATIENT)
Dept: GENERAL RADIOLOGY | Age: 46
End: 2021-09-13
Payer: COMMERCIAL

## 2021-09-13 VITALS
BODY MASS INDEX: 27.31 KG/M2 | HEIGHT: 64 IN | WEIGHT: 160 LBS | OXYGEN SATURATION: 96 % | RESPIRATION RATE: 16 BRPM | HEART RATE: 75 BPM | DIASTOLIC BLOOD PRESSURE: 81 MMHG | SYSTOLIC BLOOD PRESSURE: 138 MMHG | TEMPERATURE: 98.6 F

## 2021-09-13 DIAGNOSIS — S20.211A RIB CONTUSION, RIGHT, INITIAL ENCOUNTER: Primary | ICD-10-CM

## 2021-09-13 PROCEDURE — 71101 X-RAY EXAM UNILAT RIBS/CHEST: CPT

## 2021-09-13 PROCEDURE — 99283 EMERGENCY DEPT VISIT LOW MDM: CPT

## 2021-09-13 RX ORDER — NAPROXEN 500 MG/1
500 TABLET ORAL 2 TIMES DAILY WITH MEALS
Qty: 30 TABLET | Refills: 0 | Status: SHIPPED | OUTPATIENT
Start: 2021-09-13 | End: 2021-12-07 | Stop reason: SDUPTHER

## 2021-09-13 RX ORDER — METHOCARBAMOL 500 MG/1
500 TABLET, FILM COATED ORAL 3 TIMES DAILY
Qty: 30 TABLET | Refills: 0 | Status: SHIPPED | OUTPATIENT
Start: 2021-09-13 | End: 2021-09-23

## 2021-09-13 RX ORDER — NAPROXEN 250 MG/1
500 TABLET ORAL ONCE
Status: DISCONTINUED | OUTPATIENT
Start: 2021-09-13 | End: 2021-09-13 | Stop reason: HOSPADM

## 2021-09-13 ASSESSMENT — PAIN DESCRIPTION - FREQUENCY: FREQUENCY: CONTINUOUS

## 2021-09-13 ASSESSMENT — ENCOUNTER SYMPTOMS
NAUSEA: 0
COLOR CHANGE: 0
WHEEZING: 0
VOMITING: 0
BACK PAIN: 1
SHORTNESS OF BREATH: 0
DIARRHEA: 0
ABDOMINAL PAIN: 0
COUGH: 0

## 2021-09-13 ASSESSMENT — PAIN DESCRIPTION - PAIN TYPE: TYPE: ACUTE PAIN

## 2021-09-13 ASSESSMENT — PAIN DESCRIPTION - DESCRIPTORS: DESCRIPTORS: SHARP

## 2021-09-13 ASSESSMENT — PAIN DESCRIPTION - LOCATION: LOCATION: BACK

## 2021-09-13 ASSESSMENT — PAIN SCALES - GENERAL: PAINLEVEL_OUTOF10: 9

## 2021-09-13 ASSESSMENT — PAIN DESCRIPTION - ORIENTATION: ORIENTATION: RIGHT

## 2021-09-13 NOTE — ED PROVIDER NOTES
**ADVANCED PRACTICE PROVIDER, I HAVE EVALUATED THIS [facility]  ED  EMERGENCY DEPARTMENT ENCOUNTER      Pt Name: Riddhi Blas  [facility]:[id number]  Yulietgfibis 1975  Date of evaluation: 9/13/2021  Provider: FAM Gottlieb CNP      Chief Complaint:    Chief Complaint   Patient presents with    Back Pain         Nursing Notes, Past Medical Hx, Past Surgical Hx, Social Hx, Allergies, and Family Hx were all reviewed and agreed with or any disagreements were addressed in the HPI.    HPI: (Location, Duration, Timing, Severity, Quality, Assoc Sx, Context, Modifying factors)    Chief Complaint of right posterior rib pain    This is a  39 y.o. 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 He denies any head or neck pain. No head injury or loss of consciousness. Denies any anterior chest pain, no abdominal pain, no nausea vomiting or diarrhea. Rates his right-sided back pain a 9 out of 10 however it is mostly in the rib region and the posterior thoracic side. Injury occurred prior to ED arrival, denies any additional complaints, no additional aggravating relieving factors. Patient presents awake, alert and in no acute distress or toxic appearance.     PastMedical/Surgical History:      Diagnosis Date    Seizures ([address]          Procedure Laterality Date    LASIK  2009       Medications:  Previous Medications    ATORVASTATIN (LIPITOR) 20 MG TABLET    Take 1 tablet by mouth daily    DICLOFENAC SODIUM (VOLTAREN) 1 % GEL    Apply 2 g topically 4 times daily as needed for Pain    FLUTICASONE (FLONASE) 50 MCG/ACT NASAL SPRAY    2 sprays by Each Nostril route daily    KETOROLAC (TORADOL) 10 MG TABLET    Take 1 tablet by mouth every 6 hours as needed for Pain    PHENYTOIN (DILANTIN) 100 MG ER CAPSULE    TAKE 6 CAPSULES BY MOUTH EVERY DAY    SILDENAFIL (VIAGRA) 50 MG TABLET    Take 2 tablets by mouth daily as needed for Erectile Dysfunction (Take 1 hr prior to sexual activity) OK to substitute for generic brand if Dajuan Escamilla too expensive         Review of Systems:  (2-9 systems needed)  Review of Systems   Constitutional: Negative for chills and fever. HENT: Negative for congestion. Respiratory: Negative for cough, shortness of breath and wheezing. Cardiovascular: Negative for chest pain. Gastrointestinal: Negative for abdominal pain, diarrhea, nausea and vomiting. Genitourinary: Negative for difficulty urinating, dysuria, frequency and hematuria. Musculoskeletal: Positive for back pain. Patient complains of right posterior rib pain, patient states just prior to ED arrival he was at work on a carpet roll when the role was pulled only for him to fall backwards landing on his right posterior ribs, he has having pain and discomfort in this area. He denies any head or neck pain. No head injury or loss of consciousness. Denies any anterior chest pain   Skin: Negative for color change. Neurological: Negative for weakness, numbness and headaches. \"Positives and Pertinent negatives as per HPI\"    Physical Exam:  Physical Exam  Vitals and nursing note reviewed. Constitutional:       Appearance: He is well-developed. He is not diaphoretic. HENT:      Head: Normocephalic. Right Ear: External ear normal.      Left Ear: External ear normal.   Eyes:      General: No scleral icterus. Right eye: No discharge. Left eye: No discharge. Cardiovascular:      Rate and Rhythm: Normal rate. Pulmonary:      Effort: Pulmonary effort is normal. No respiratory distress.       Comments: Airway patent with symmetric rise and fall of chest, lungs are clear anteriorly and posteriorly, patient has reproducible tenderness to the right posterior rib region, there is no surrounding erythema edema, rashes or lesions. Abdominal:      Palpations: Abdomen is soft. Musculoskeletal:         General: Normal range of motion. Cervical back: Normal range of motion and neck supple. Skin:     General: Skin is warm. Capillary Refill: Capillary refill takes less than 2 seconds. Coloration: Skin is not pale. Neurological:      General: No focal deficit present. Mental Status: He is alert and oriented to person, place, and time. GCS: GCS eye subscore is 4. GCS verbal subscore is 5. GCS motor subscore is 6. Psychiatric:         Behavior: Behavior normal.         MEDICAL DECISION MAKING    Vitals:    Vitals:    09/13/21 1621   BP: 127/74   Pulse: 76   Resp: 17   Temp: 98.6 °F (37 °C)   TempSrc: Oral   SpO2: 95%   Weight: 160 lb (72.6 kg)   Height: 5' 4\" (1.626 m)       LABS:Labs Reviewed - No data to display     Remainder of labs reviewed and were negative at this time or not returned at the time of this note. RADIOLOGY:   Non-plain film images such as CT, Ultrasound and MRI are read by the radiologist. Jessika NAJERA, FAM - CNP have directly visualized the radiologic plain film image(s) with the below findings:      Interpretation per the Radiologist below, if available at the time of this note:    XR RIBS RIGHT INCLUDE CHEST (MIN 3 VIEWS)   Final Result   No acute abnormalities seen in the chest or right ribs      Old displaced right clavicular fracture                MEDICAL DECISION MAKING / ED COURSE:      PROCEDURES:   Procedures    None    Patient was given:  Medications   naproxen (NAPROSYN) tablet 500 mg (500 mg Oral Not Given 9/13/21 1745)       Patient complains of right posterior rib pain, patient states just prior to ED arrival he was at work on a carpet roll when the role was pulled only for him to fall backwards landing on his right posterior ribs, he has having pain and discomfort in this area. He denies any head or neck pain.   No head injury or loss of consciousness. Denies any anterior chest pain    After Evaluation and examination the patient he was given a dose of anti-inflammatories and ordered an x-ray. X-ray shows no acute abnormality seen in the chest right ribs, there is an old displaced right clavicular fracture. I do believe the pain is all musculoskeletal in nature. His unremarkable neurological exam with no acute focal deficits. In addition, Pt denies any history of new numbness, weakness, incontinence of bowel or bladder, constipation, saddle anesthesia or paresthesias. I estimate there is LOW risk for ABDOMINAL AORTIC ANEURYSM, CAUDA EQUINA SYNDROME, EPIDURAL MASS LESION, OR CORD COMPRESSION, thus I consider the discharge disposition reasonable. Therefore, shared medical decision was made to the patient myself we agreed he would be discharged home, I attempted to give him Naprosyn here department, he states he takes it at home. Educated him that we start him on muscle relaxers to help with some of the pain. He was given incentive spirometer as well. Educated to follow-up with the PCP in the next 2 to 3 days for reevaluation, return to the ER for worsening symptoms. The patient tolerated their visit well. I evaluated the patient. The physician was available for consultation as needed. The patient and / or the family were informed of the results of any tests, a time was given to answer questions, a plan was proposed and they agreed with plan. Patient verbalized understanding of discharge instructions and the patient was discharged from the department in stable condition. CLINICAL IMPRESSION:  1.  Rib contusion, right, initial encounter        DISPOSITION Decision To Discharge 09/13/2021 05:44:08 PM      PATIENT REFERRED TO:  Gideon Martinez MD  [facility]  [phone]    Schedule an appointment as soon as possible for a visit in 2 days  Follow-up with your PCP in the next 2 to 3 days for reevaluation      DISCHARGE MEDICATIONS:  New Prescriptions    METHOCARBAMOL (ROBAXIN) 500 MG TABLET    Take 1 tablet by mouth 3 times daily for 10 days    NAPROXEN (NAPROSYN) 500 MG TABLET    Take 1 tablet by mouth 2 times daily (with meals)       DISCONTINUED MEDICATIONS:  Discontinued Medications    No medications on file              (Please note the MDM and HPI sections of this note were completed with a voice recognition program.  Efforts were made to edit the dictations but occasionally words are mis-transcribed.)    Electronically signed, FAM Kaufman CNP,          FAM Kaufman CNP  09/13/21 9592

## 2021-09-13 NOTE — ED NOTES
Provided ISP spirometer and education. Verbalizes and returns demonstration of proper use.  Provided d/c instructions, medication education and follow up plan of car ambulated out with a steady gait      Day Whitten RN  09/13/21 2818

## 2021-09-13 NOTE — ED NOTES
Went to medicate patient, reporting has been taking at home with no relief.  Provider informed  No new medication orders      Tejas Lamar RN  09/13/21 0804

## 2021-09-13 NOTE — ED TRIAGE NOTES
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 States pain with movement ambulates with a steady gait has a back brace in place. OTC medications with no relief.  Denied numbness tingling in lower extremities denied bowel or bladder dysfunction

## 2021-11-09 DIAGNOSIS — R56.9 SEIZURE (HCC): ICD-10-CM

## 2021-11-09 RX ORDER — PHENYTOIN SODIUM 100 MG/1
CAPSULE, EXTENDED RELEASE ORAL
Qty: 540 CAPSULE | Refills: 1 | Status: SHIPPED | OUTPATIENT
Start: 2021-11-09 | End: 2021-12-07 | Stop reason: SDUPTHER

## 2021-11-09 NOTE — TELEPHONE ENCOUNTER
Last ov - 06/29/2021 - Dr. Dannielle Cortez  Next ov - 12/07/2021 - Dr. Nato Hu last filled   06/29/2021 - 5 rf

## 2021-12-07 ENCOUNTER — OFFICE VISIT (OUTPATIENT)
Dept: INTERNAL MEDICINE CLINIC | Age: 46
End: 2021-12-07
Payer: COMMERCIAL

## 2021-12-07 VITALS
WEIGHT: 171.5 LBS | TEMPERATURE: 97 F | SYSTOLIC BLOOD PRESSURE: 123 MMHG | HEART RATE: 75 BPM | OXYGEN SATURATION: 97 % | RESPIRATION RATE: 18 BRPM | BODY MASS INDEX: 29.44 KG/M2 | DIASTOLIC BLOOD PRESSURE: 82 MMHG

## 2021-12-07 DIAGNOSIS — N52.8 OTHER MALE ERECTILE DYSFUNCTION: ICD-10-CM

## 2021-12-07 DIAGNOSIS — E78.9 LIPID DISORDER: Primary | ICD-10-CM

## 2021-12-07 DIAGNOSIS — T46.6X5S: ICD-10-CM

## 2021-12-07 DIAGNOSIS — R56.9 SEIZURE (HCC): ICD-10-CM

## 2021-12-07 PROCEDURE — 99213 OFFICE O/P EST LOW 20 MIN: CPT | Performed by: STUDENT IN AN ORGANIZED HEALTH CARE EDUCATION/TRAINING PROGRAM

## 2021-12-07 RX ORDER — SILDENAFIL 50 MG/1
100 TABLET, FILM COATED ORAL DAILY PRN
Qty: 30 TABLET | Refills: 2 | Status: SHIPPED | OUTPATIENT
Start: 2021-12-07 | End: 2022-05-24 | Stop reason: SDUPTHER

## 2021-12-07 RX ORDER — PHENYTOIN SODIUM 100 MG/1
600 CAPSULE, EXTENDED RELEASE ORAL DAILY
Qty: 540 CAPSULE | Refills: 1 | Status: SHIPPED | OUTPATIENT
Start: 2021-12-07 | End: 2022-01-10

## 2021-12-07 RX ORDER — NAPROXEN 500 MG/1
500 TABLET ORAL 2 TIMES DAILY WITH MEALS
Qty: 30 TABLET | Refills: 0 | Status: SHIPPED | OUTPATIENT
Start: 2021-12-07 | End: 2022-05-24

## 2021-12-07 RX ORDER — ATORVASTATIN CALCIUM 40 MG/1
40 TABLET, FILM COATED ORAL DAILY
Qty: 90 TABLET | Refills: 1 | Status: SHIPPED | OUTPATIENT
Start: 2021-12-07 | End: 2021-12-07

## 2021-12-07 RX ORDER — ATORVASTATIN CALCIUM 40 MG/1
40 TABLET, FILM COATED ORAL DAILY
Qty: 90 TABLET | Refills: 1 | Status: SHIPPED | OUTPATIENT
Start: 2021-12-07 | End: 2022-05-24 | Stop reason: SDUPTHER

## 2021-12-07 NOTE — PROGRESS NOTES
[facility] ([facility]) outpatient clinic note  PGY-2    Patient's PCP: Shirlene Cuba MD    Interval History   Paris Marcum is a 42 y.o. male with hx of seizure, ED, HLD and back pain presented to the [facility] for follow-up visit and BMV paperwork due to his seizure disorder. Patient's last seizure was in 2018 which was believed to be due to dehydration. He had not had seizures since then he has been compliant with his home phenytoin 600 mg daily. He smokes half a pack of cigarettes per day but is not interested in quitting. For his back pain, the patient has been taking naproxen 500 mg several days a week. He is also requesting refill of his home medications. Of note, patient works in a [employer]. MEDICATIONS:     Current Outpatient Medications on File Prior to Visit   Medication Sig Dispense Refill    phenytoin (DILANTIN) 100 MG ER capsule TAKE 6 CAPSULES BY MOUTH EVERY  capsule 1    naproxen (NAPROSYN) 500 MG tablet Take 1 tablet by mouth 2 times daily (with meals) 30 tablet 0    fluticasone (FLONASE) 50 MCG/ACT nasal spray 2 sprays by Each Nostril route daily 3 Bottle 1    sildenafil (VIAGRA) 50 MG tablet Take 2 tablets by mouth daily as needed for Erectile Dysfunction (Take 1 hr prior to sexual activity) OK to substitute for generic brand if Viagara too expensive 30 tablet 2    atorvastatin (LIPITOR) 20 MG tablet Take 1 tablet by mouth daily (Patient not taking: Reported on 12/7/2021) 30 tablet 5    diclofenac sodium (VOLTAREN) 1 % GEL Apply 2 g topically 4 times daily as needed for Pain 30 g 0    ketorolac (TORADOL) 10 MG tablet Take 1 tablet by mouth every 6 hours as needed for Pain 40 tablet 0     No current facility-administered medications on file prior to visit.          Scheduled Meds:   Continuous Infusions:  PRN Meds:    Allergies: No Known Allergies    PHYSICAL EXAM:       Vitals: /82   Pulse 75   Temp 97 °F (36.1 °C) (Temporal)   Resp 18   Wt 171 lb 8 oz (77.8 kg)   SpO2 97%   BMI 29.44 kg/m²     I/O:  No intake or output data in the 24 hours ending 12/07/21 1437  [unfilled]  [unfilled]    Physical Examination:   ? General appearance: alert, appears stated age and cooperative. ? Skin: Skin color, texture, turgor normal.   ? HEENT: Head: Normocephalic. ? Neck: no adenopathy. ? Lungs: clear to auscultation bilaterally. ? Heart: regular rate and rhythm. ? Abdomen: soft, non-tender; bowel sounds normal.  ? Extremities: extremities normal.  ? Neurologic:Mental status: Alert, oriented, thought content appropriate. DATA:       Labs:  CBC: No results for input(s): WBC, HGB, HCT, PLT in the last 72 hours. BMP: No results for input(s): NA, K, CL, CO2, BUN, CREATININE, GLUCOSE in the last 72 hours. Invalid input(s):  CA,  PHOS  LFT's: No results for input(s): AST, ALT, ALB, BILITOT, ALKPHOS in the last 72 hours. Troponin: No results for input(s): TROPONINI in the last 72 hours. BNP: No results for input(s): BNP in the last 72 hours. ABGs: No results for input(s): PHART, IMD1XBA, PO2ART in the last 72 hours. INR: No results for input(s): INR in the last 72 hours. Lipids: No results for input(s): CHOL, HDL in the last 72 hours. Invalid input(s): LDLCALCU    U/A:No results for input(s): NITRITE, COLORU, PHUR, LABCAST, WBCUA, RBCUA, MUCUS, TRICHOMONAS, YEAST, BACTERIA, CLARITYU, SPECGRAV, LEUKOCYTESUR, UROBILINOGEN, BILIRUBINUR, BLOODU, GLUCOSEU, AMORPHOUS in the last 72 hours. Invalid input(s): Marilee Telles    Rad:  No orders to display       ASSESSMENT AND PLAN:   1. Seizure ([address]  - phenytoin (DILANTIN) 100 MG ER capsule; TAKE 6 CAPSULES BY MOUTH EVERY DAY  Dispense: 180 capsule; Refill: 5  -BMV paperwork filled out. OK to drive as patient has been seizure free since 2018. Will have to do yearly checks until 2023    2.  Hyperlipidemia LDL goal <100  - atorvastatin 40 mg- take 1/2 tablet daily (chepaer than the 20 mg pills)  - F/u lipid profile and LFTs    3. Smoking  Smokes 1/2 a pack daily.   - not interested in cessation at this time  - appraised of short and long term health risks  - advised to contemplate cessation in the mean time     This patient has been staffed and discussed with Gavi Ruby MD  -----------------------------  Julien Benjamin MD, PGY-2  Internal Medicine

## 2021-12-07 NOTE — PATIENT INSTRUCTIONS
Please do your lab work and urine test.  Please split the Atorvastatin pill to 2 and take 1/2 a pill once a day. Please come back in 6 months.

## 2021-12-23 ENCOUNTER — HOSPITAL ENCOUNTER (EMERGENCY)
Age: 46
Discharge: HOME | End: 2021-12-23
Attending: EMERGENCY MEDICINE
Payer: COMMERCIAL

## 2021-12-23 ENCOUNTER — APPOINTMENT (OUTPATIENT)
Dept: CT IMAGING | Age: 46
End: 2021-12-23
Payer: COMMERCIAL

## 2021-12-23 VITALS
SYSTOLIC BLOOD PRESSURE: 140 MMHG | HEIGHT: 64 IN | RESPIRATION RATE: 16 BRPM | DIASTOLIC BLOOD PRESSURE: 94 MMHG | BODY MASS INDEX: 28.17 KG/M2 | WEIGHT: 165 LBS | TEMPERATURE: 98.3 F | HEART RATE: 78 BPM | OXYGEN SATURATION: 97 %

## 2021-12-23 DIAGNOSIS — S09.90XA CLOSED HEAD INJURY, INITIAL ENCOUNTER: ICD-10-CM

## 2021-12-23 DIAGNOSIS — S16.1XXA STRAIN OF NECK MUSCLE, INITIAL ENCOUNTER: ICD-10-CM
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
## 2021-12-23 PROCEDURE — 70450 CT HEAD/BRAIN W/O DYE: CPT

## 2021-12-23 PROCEDURE — 6370000000 HC RX 637 (ALT 250 FOR IP): Performed by: EMERGENCY MEDICINE

## 2021-12-23 PROCEDURE — 99284 EMERGENCY DEPT VISIT MOD MDM: CPT

## 2021-12-23 PROCEDURE — 72125 CT NECK SPINE W/O DYE: CPT

## 2021-12-23 RX ORDER — ACETAMINOPHEN 325 MG/1
650 TABLET ORAL ONCE
Status: COMPLETED | OUTPATIENT
Start: 2021-12-23 | End: 2021-12-23

## 2021-12-23 RX ADMIN — ACETAMINOPHEN 650 MG: 325 TABLET ORAL at 21:14

## 2021-12-23 ASSESSMENT — ENCOUNTER SYMPTOMS
BACK PAIN: 0
ABDOMINAL PAIN: 0
NAUSEA: 0
VOMITING: 0
SHORTNESS OF BREATH: 0
CHEST TIGHTNESS: 0
DIARRHEA: 0
COUGH: 0

## 2021-12-23 ASSESSMENT — PAIN DESCRIPTION - PAIN TYPE: TYPE: ACUTE PAIN

## 2021-12-23 ASSESSMENT — PAIN DESCRIPTION - LOCATION: LOCATION: HEAD

## 2021-12-23 ASSESSMENT — PAIN SCALES - GENERAL
PAINLEVEL_OUTOF10: 7
PAINLEVEL_OUTOF10: 9
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
## 2021-12-27 ASSESSMENT — VISUAL ACUITY: OU: 1

## 2022-01-10 DIAGNOSIS — R56.9 SEIZURE (HCC): ICD-10-CM

## 2022-01-10 RX ORDER — PHENYTOIN SODIUM 100 MG/1
CAPSULE, EXTENDED RELEASE ORAL
Qty: 180 CAPSULE | Refills: 2 | Status: SHIPPED | OUTPATIENT
Start: 2022-01-10 | End: 2022-05-24 | Stop reason: SDUPTHER

## 2022-05-24 ENCOUNTER — OFFICE VISIT (OUTPATIENT)
Dept: INTERNAL MEDICINE CLINIC | Age: 47
End: 2022-05-24
Payer: COMMERCIAL

## 2022-05-24 VITALS
DIASTOLIC BLOOD PRESSURE: 74 MMHG | HEIGHT: 64 IN | BODY MASS INDEX: 29.09 KG/M2 | OXYGEN SATURATION: 97 % | WEIGHT: 170.4 LBS | TEMPERATURE: 98.1 F | HEART RATE: 73 BPM | SYSTOLIC BLOOD PRESSURE: 113 MMHG

## 2022-05-24 DIAGNOSIS — R56.9 SEIZURE (HCC): Primary | ICD-10-CM

## 2022-05-24 DIAGNOSIS — N52.8 OTHER MALE ERECTILE DYSFUNCTION: ICD-10-CM

## 2022-05-24 PROCEDURE — 99213 OFFICE O/P EST LOW 20 MIN: CPT | Performed by: STUDENT IN AN ORGANIZED HEALTH CARE EDUCATION/TRAINING PROGRAM

## 2022-05-24 RX ORDER — SILDENAFIL 50 MG/1
100 TABLET, FILM COATED ORAL DAILY PRN
Qty: 30 TABLET | Refills: 2 | Status: SHIPPED | OUTPATIENT
Start: 2022-05-24 | End: 2022-07-08

## 2022-05-24 RX ORDER — PHENYTOIN SODIUM 100 MG/1
CAPSULE, EXTENDED RELEASE ORAL
Qty: 180 CAPSULE | Refills: 5 | Status: SHIPPED | OUTPATIENT
Start: 2022-05-24

## 2022-05-24 RX ORDER — ATORVASTATIN CALCIUM 40 MG/1
20 TABLET, FILM COATED ORAL DAILY
Qty: 90 TABLET | Refills: 5 | Status: SHIPPED | OUTPATIENT
Start: 2022-05-24

## 2022-05-24 ASSESSMENT — PATIENT HEALTH QUESTIONNAIRE - PHQ9
SUM OF ALL RESPONSES TO PHQ QUESTIONS 1-9: 0
1. LITTLE INTEREST OR PLEASURE IN DOING THINGS: 0
2. FEELING DOWN, DEPRESSED OR HOPELESS: 0
SUM OF ALL RESPONSES TO PHQ QUESTIONS 1-9: 0
SUM OF ALL RESPONSES TO PHQ QUESTIONS 1-9: 0
SUM OF ALL RESPONSES TO PHQ9 QUESTIONS 1 & 2: 0
SUM OF ALL RESPONSES TO PHQ QUESTIONS 1-9: 0

## 2022-05-24 NOTE — PROGRESS NOTES
Department Of Internal Medicine     General Medicine/Primary Care      Established Patient Visit    Patient:  Lisa Aldana                                               : 1975  Age: 42 y.o. MRN: [id number]  Date : 2022    History Obtained From:  Patient, chart review    CHIEF COMPLAINT:  6 mo f/u    HISTORY OF PRESENT ILLNESS:   The patient is a 42 y.o. male PMH HLD, ED, seizure who presents to the clinic for 6 month follow up . Denies  cp, sob, nvd, dysuria, constipation, fever, chills. Seizure disorder - compliant with phenytoin 600 mg daily, last seizure  which was thought to be 2/2 dehydration  HLD - compliant with lipitor. No reported side effects   Back pain - managed with aleve  Tobacco use disorder - smokes 1/2-1 ppd for the past 20 years. Not interested in quitting. Past Medical History:        Diagnosis Date    Seizures [facility])        Past Surgical History:        Procedure Laterality Date    LASIK  2009       Family History:       Problem Relation Age of Onset    High Blood Pressure Father        Social History:   TOBACCO:   reports that he has been smoking cigarettes. He has been smoking about 0.50 packs per day. He has quit using smokeless tobacco.  ETOH:   reports no history of alcohol use. OCCUPATION:      Allergies:  Patient has no known allergies. Current Medications:    Prior to Admission medications    Medication Sig Start Date End Date Taking?  Authorizing Provider   phenytoin (DILANTIN) 100 MG ER capsule TAKE 6 CAPSULES BY MOUTH EVERY DAY 22  Yes Tawanna Martines,    atorvastatin (LIPITOR) 40 MG tablet Take 0.5 tablets by mouth daily 22  Yes Tawanna Martines,    sildenafil (VIAGRA) 50 MG tablet Take 2 tablets by mouth daily as needed for Erectile Dysfunction (Take 1 hr prior to sexual activity) OK to substitute for generic brand if Viagara too expensive 22 Yes Bernice Pettit, DO   fluticasone (FLONASE) 50 MCG/ACT nasal spray 2 sprays by Each Nostril route daily 25/36/62  Yes Adalberto Moreno MD       REVIEW OF SYSTEMS:  · CONSTITUTIONAL: NO Recent weight changes,fatigue,fever,chills or night sweats   · EYES: NO blurriness,tearing,itching or acute change in vision   · EARS: NO hearing loss,tinnitus,vertigo,discharge or earache. · NOSE: NO rhinorrhea,sneezing,itching,allergy or epistaxis   · MOUTH/THROAT: NO bleeding gums,hoarseness or sore throat. · RESP: NO SOB,wheeze,cough,sputum,hemoptysis or bronchitis   · CVS: NO chest pain,palpitations,dyspnea on exertion,orthopnea,paroxysmal nocturnal dyspnea or edema   · GI: NO appetite changes, nausea,vomiting,or diarrhea,indigestion,dysphagia,change in bowel movements, or abdominal pain. · : NO Urinary frequency, hesitancy, urgency, polyuria, dysuria, hematuria, nocturia, or incontinence. · HEM/LYMPH: NO Anemia,bleeding tendency   · MSK: NO New myalgias,bone pain,joint pain,swelling or stiffness and has had no change in gait. · NEURO: NO LOC, memory loss, forgetfulness, periods of confusion, difficulty concentrating, seizures, decline in intellect, nervousness, insomina, aphasia, or dysarthria. · SKIN : NO skin or hair changes,and has no itching,rashes,sores. Denies breast lumps,masses,pain or discharge. · PSYCH: Neg depression,personality changes,anxiety. · ENDO: Neg polydipsia,polyuria,abnormal weight changes,heat /cold intolerance. · ALL/IMM : Neg reactions to drugs other than listed,food,insects,skin rash,trouble breathing,local or general lymph node enlargement or tenderness. Physical Exam:      Vitals: /74 (Site: Left Upper Arm, Position: Sitting, Cuff Size: Medium Adult)   Pulse 73   Temp 98.1 °F (36.7 °C) (Temporal)   Ht 5' 4\" (1.626 m)   Wt 170 lb 6.4 oz (77.3 kg)   SpO2 97%   BMI 29.25 kg/m²     Body mass index is 29.25 kg/m².   Wt Readings from Last 3 Encounters:   05/24/22 170 lb 6.4 oz (77.3 kg)   12/23/21 165 lb (74.8 kg)   12/07/21 171 lb 8 oz (77.8 kg)       · Gen - Alert, no signs of distress, appears stated age and cooperative  · HEENT - NC/AT  · CVS - Regular rate. Regular rhythm. No mumur or rub. No edema  · Resp - No accessory muscle use. No crackles. No wheezing. No rhonchi  · GI - Non-tender. Non-distended. No masses. No organmegaly. Normal bowel sounds  · Skin - Warm and dry. No nodule on exposed extremities. No rash on exposed extremities  · Lymph - No cervical LAD. No supraclavicular LAD. · MSK - No cyanosis. No joint deformity. No clubbing  · Neuro - Awake. Follows commands. CN II-XII Grossly Intact. Sensation intact. Strength 5/5 UE and LE. Reflexes 1+ UE and LE dwight. Downgoing plantar dwight. Normal Gait. Finger-to-nose and rapidly alternating movements intact. Normal pain response  · Psych - Oriented to person, place, time. No anxiety or agitation.      LABS:    CBC:   Lab Results   Component Value Date    WBC 6.6 03/04/2020    HGB 15.3 03/04/2020    HCT 44.4 03/04/2020    MCV 91.5 03/04/2020     03/04/2020         No results found for: IRON, TIBC, FERRITIN, FOLATE, [id number], PTH                                                          BMP:    Lab Results   Component Value Date     03/04/2020    K 4.5 03/04/2020    CL 99 03/04/2020    CO2 26 03/04/2020       LFT's:   Lab Results   Component Value Date    ALT 20 03/04/2020    AST 18 03/04/2020    ALKPHOS 124 03/04/2020    BILITOT <0.2 03/04/2020       Lipids:   Lab Results   Component Value Date    CHOL 213 (H) 03/04/2020    HDL 43 03/04/2020    LDLCALC 155 (H) 03/04/2020    TRIG 74 03/04/2020       INR: No results found for: INR, PROTIME    U/A:  Lab Results   Component Value Date    LABMICR YES 04/04/2020        No results found for: LABA1C     Lab Results   Component Value Date    CREATININE 0.7 (L) 03/04/2020        Assessment/Plan:     Seizure disorder  - compliant with phenytoin 600 mg daily, last seizure 2018 which was thought to be 2/2 dehydration  - continue phenytoin     HLD - compliant with lipitor. No reported side effects. Patient has prescription for 40 mg lipitor but takes half a tablet because that is cheaper than the 20 mg tablets  - CMP, CBC, lipid pending  - continue lipitor 20 mg    Back pain  - takes aleve 1-2x  week, no loss of bladder/bowel control. Neuro exam WNL  - monitor    Tobacco use disorder   - smokes 1/2-1 ppd for the past 20 years.  Not nterested in quitting.   - Discuss benefits of quitting    Return in 6 months      Courtney Anderson DO  Internal Medicine Resident, PGY-2  5/24/2022, 2:40 PM

## 2022-05-24 NOTE — PATIENT INSTRUCTIONS
- Please get your labs done, we will call you with the results  - Continue taking your medications, no changes have been made  - Return to the clinic in 6 months      Patient Education        Learning About Benefits From Quitting Smoking  How does quitting smoking make you healthier? If you're thinking about quitting smoking, you may have a few reasons to besmoke-free. Your health may be one of them.  When you quit smoking, you lower your risks for cancer, lung disease, heart attack, stroke, blood vessel disease, and blindness from macular degeneration.  When you're smoke-free, you get sick less often, and you heal faster. You are less likely to get colds, flu, bronchitis, and pneumonia.  As a nonsmoker, you may find that your mood is better and you are less stressed. When and how will you feel healthier? Quitting has real health benefits that start from day 1 of being smoke-free. And the longer you stay smoke-free, the healthier you get and the better youfeel. The first hours   After just 20 minutes, your blood pressure and heart rate go down. That means there's less stress on your heart and blood vessels.  Within 12 hours, the level of carbon monoxide in your blood drops back to normal. That makes room for more oxygen. With more oxygen in your body, you may notice that you have more energy than when you smoked. After 2 weeks   Your lungs start to work better.  Your risk of heart attack starts to drop. After 1 month   When your lungs are clear, you cough less and breathe deeper, so it's easier to be active.  Your sense of taste and smell return. That means you can enjoy food more than you have since you started smoking. Over the years   Over the years, your risks of heart disease, heart attack, and stroke are lower.  After 10 years, your risk of dying from lung cancer is cut by about half. And your risk for many other types of cancer is lower too.   How would quitting help others in your life?  When you quit smoking, you improve the health of everyone who now breathes inyour smoke.  Their heart, lung, and cancer risks drop, much like yours.  They are sick less. For babies and small children, living smoke-free means they're less likely to have ear infections, pneumonia, and bronchitis.  If you're a woman who is or will be pregnant someday, quitting smoking means a healthier .  Children who are close to you are less likely to become adult smokers. Where can you learn more? Go to https://[employer] PlanetpeZarpo.[employer]ola100. org and sign in to your [employer] account. Enter [phone] in the [facility] box to learn more about \"Learning About Benefits From Quitting Smoking. \"     If you do not have an account, please click on the \"Sign Up Now\" link. Current as of: 2021               Content Version: 13.2  © 7486-4515 [employer]wise, Incorporated. Care instructions adapted under license by [facility] ([facility]). If you have questions about a medical condition or this instruction, always ask your healthcare professional. [place] [address] any warranty or liability for your use of this information.

## 2022-12-14 ENCOUNTER — OFFICE VISIT (OUTPATIENT)
Dept: URGENT CARE | Age: 47
End: 2022-12-14

## 2022-12-14 VITALS
TEMPERATURE: 98.9 F | OXYGEN SATURATION: 94 % | RESPIRATION RATE: 14 BRPM | SYSTOLIC BLOOD PRESSURE: 142 MMHG | HEART RATE: 93 BPM | BODY MASS INDEX: 29.84 KG/M2 | HEIGHT: 64 IN | DIASTOLIC BLOOD PRESSURE: 96 MMHG | WEIGHT: 174.8 LBS

## 2022-12-14 DIAGNOSIS — K12.2: Primary | ICD-10-CM

## 2022-12-14 DIAGNOSIS — Z72.0 TOBACCO USE: ICD-10-CM

## 2022-12-14 DIAGNOSIS — R03.0 ELEVATED BP WITHOUT DIAGNOSIS OF HYPERTENSION: ICD-10-CM

## 2022-12-14 RX ORDER — AMOXICILLIN AND CLAVULANATE POTASSIUM 875; 125 MG/1; MG/1
1 TABLET, FILM COATED ORAL 2 TIMES DAILY
Qty: 14 TABLET | Refills: 0 | Status: SHIPPED | OUTPATIENT
Start: 2022-12-14 | End: 2022-12-21

## 2022-12-14 RX ORDER — NAPROXEN 500 MG/1
500 TABLET ORAL 2 TIMES DAILY WITH MEALS
Qty: 14 TABLET | Refills: 0 | Status: SHIPPED | OUTPATIENT
Start: 2022-12-14 | End: 2022-12-21

## 2022-12-14 ASSESSMENT — ENCOUNTER SYMPTOMS
FACIAL SWELLING: 1
SINUS PRESSURE: 0
SORE THROAT: 0
COUGH: 1
TROUBLE SWALLOWING: 0
RHINORRHEA: 1
SHORTNESS OF BREATH: 0
SINUS PAIN: 0

## 2022-12-15 ENCOUNTER — OFFICE VISIT (OUTPATIENT)
Dept: INTERNAL MEDICINE CLINIC | Age: 47
End: 2022-12-15
Payer: COMMERCIAL

## 2022-12-15 VITALS
OXYGEN SATURATION: 97 % | DIASTOLIC BLOOD PRESSURE: 82 MMHG | HEART RATE: 87 BPM | TEMPERATURE: 99.9 F | BODY MASS INDEX: 29.76 KG/M2 | SYSTOLIC BLOOD PRESSURE: 127 MMHG | RESPIRATION RATE: 20 BRPM | WEIGHT: 174.3 LBS | HEIGHT: 64 IN

## 2022-12-15 DIAGNOSIS — E78.5 HYPERLIPIDEMIA LDL GOAL <100: ICD-10-CM

## 2022-12-15 DIAGNOSIS — N52.8 OTHER MALE ERECTILE DYSFUNCTION: ICD-10-CM

## 2022-12-15 DIAGNOSIS — R56.9 SEIZURE (HCC): ICD-10-CM

## 2022-12-15 DIAGNOSIS — H05.012 ORBITAL CELLULITIS ON LEFT: ICD-10-CM

## 2022-12-15 DIAGNOSIS — F17.200 SMOKING: ICD-10-CM

## 2022-12-15 PROCEDURE — 99213 OFFICE O/P EST LOW 20 MIN: CPT

## 2022-12-15 RX ORDER — ATORVASTATIN CALCIUM 20 MG/1
20 TABLET, FILM COATED ORAL DAILY
Qty: 90 TABLET | Refills: 3 | Status: SHIPPED | OUTPATIENT
Start: 2022-12-15

## 2022-12-15 RX ORDER — SILDENAFIL 50 MG/1
100 TABLET, FILM COATED ORAL DAILY PRN
Qty: 30 TABLET | Refills: 2 | Status: SHIPPED | OUTPATIENT
Start: 2022-12-15 | End: 2022-12-15

## 2022-12-15 RX ORDER — SILDENAFIL 50 MG/1
100 TABLET, FILM COATED ORAL DAILY PRN
Qty: 30 TABLET | Refills: 2 | Status: SHIPPED | OUTPATIENT
Start: 2022-12-15 | End: 2023-01-29

## 2022-12-15 RX ORDER — ATORVASTATIN CALCIUM 20 MG/1
20 TABLET, FILM COATED ORAL DAILY
Qty: 30 TABLET | Refills: 3 | Status: SHIPPED | OUTPATIENT
Start: 2022-12-15 | End: 2022-12-15

## 2022-12-15 RX ORDER — PHENYTOIN SODIUM 100 MG/1
CAPSULE, EXTENDED RELEASE ORAL
Qty: 180 CAPSULE | Refills: 5 | Status: SHIPPED | OUTPATIENT
Start: 2022-12-15 | End: 2022-12-15

## 2022-12-15 RX ORDER — PHENYTOIN SODIUM 100 MG/1
CAPSULE, EXTENDED RELEASE ORAL
Qty: 180 CAPSULE | Refills: 5 | Status: SHIPPED | OUTPATIENT
Start: 2022-12-15

## 2022-12-15 ASSESSMENT — ENCOUNTER SYMPTOMS
FACIAL SWELLING: 1
COUGH: 1
SINUS PRESSURE: 1

## 2022-12-15 NOTE — PROGRESS NOTES
Antonio Thacker (:  1975) is a 41 y.o. male,New patient, here for evaluation of the following chief complaint(s):  Facial Swelling (Pt has left side facial swelling )      ASSESSMENT/PLAN:    ICD-10-CM    1. Abscess of left internal cheek  K12.2 amoxicillin-clavulanate (AUGMENTIN) 875-125 MG per tablet     naproxen (NAPROSYN) 500 MG tablet      2. Elevated BP without diagnosis of hypertension  R03.0       3. Tobacco use  Z72.0       Antibiotics as prescribed. Smoking cessation is recommended   Monitor BP at home and call PCP if persistently elevated. ER if symptoms worsen or for any shortness of breath. Follow up with PCP tomorrow as scheduled. SUBJECTIVE/OBJECTIVE:    History provided by:  Patient   used: No    HPI:   41 y.o. male presents with symptoms of left sided facial swelling and pain (throbbing constant pain, rates 9/10) ongoing since last night. States swelling has worsened over the course of the day. Also has left upper toothache. Denies difficulty swallowing, oral lesions, drainage, fever or trauma. Has taken ibuprofen for symptoms with mild relief. States he has \"bad teeth\" but denies known dental caries. Has appt with PCP tomorrow. Vitals:    22   BP: (!) 142/96   Site: Right Upper Arm   Position: Sitting   Pulse: 93   Resp: 14   Temp: 98.9 °F (37.2 °C)   TempSrc: Oral   SpO2: 94%   Weight: 174 lb 12.8 oz (79.3 kg)   Height: 5' 4\" (1.626 m)       Review of Systems   Constitutional:  Negative for chills, diaphoresis and fever. HENT:  Positive for congestion, dental problem, facial swelling and rhinorrhea. Negative for ear pain, mouth sores, nosebleeds, sinus pressure, sinus pain, sore throat and trouble swallowing. Respiratory:  Positive for cough (productive of yellow mucous). Negative for shortness of breath. Neurological:  Negative for light-headedness, numbness and headaches. Psychiatric/Behavioral:  Hallucinations: ER.       Physical Exam  Constitutional:       Appearance: Normal appearance. He is normal weight. HENT:      Head: Normocephalic. Comments: Swelling, warmth, and tenderness to left cheek; no open internal or external lesions; unable to fully open mouth due to pain; no neck swelling; airway not compromised; left 2nd bicuspid tooth with surrounding redness and swelling      Right Ear: Tympanic membrane, ear canal and external ear normal.      Left Ear: Tympanic membrane, ear canal and external ear normal.      Nose: Nose normal.      Mouth/Throat:      Mouth: Mucous membranes are moist.      Pharynx: Oropharynx is clear. Cardiovascular:      Rate and Rhythm: Normal rate and regular rhythm. Heart sounds: Normal heart sounds. Pulmonary:      Effort: Pulmonary effort is normal.      Breath sounds: Normal breath sounds. Musculoskeletal:      Cervical back: Normal range of motion and neck supple. No rigidity or tenderness. Lymphadenopathy:      Cervical: No cervical adenopathy. Neurological:      Mental Status: He is alert. Psychiatric:         Mood and Affect: Mood normal.         Behavior: Behavior normal.         An electronic signature was used to authenticate this note.     --[employer], APRN - CNP

## 2022-12-15 NOTE — PROGRESS NOTES
[facility]    Elzbieta Lau is a 41 y.o. male, here for evaluation of the following concerns:  HLD      HPI  Pt is a 42 y.o. male PMH HLD, ED, seizure who comes in for office visit. Pt is compliant with his medications. His last seizure was in 2018. Pt smokes 1 pack a day for over 20 years. Patient recently had went into urgent care due to a left orbital cellulitis. He was given ampicillin to take for 7 days along with naproxen. He has been endorsing more frequent cough that is sputum productive with yellow in nature. Denies any fevers or chills nausea diarrhea or any other infective symptoms. Patient did not have any traumatic injury to his face, did not shave recently, or do anything that would cause for the infection to happen. 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 He is constantly active at work but does endorse eating a lot of canned foods and fast food endorsing eating 9 times out of 10 canned foods a week. Patient denies any alcohol use or illicit drug use. He has noticed he has gained significant weight over the last 2 years. He used to weigh 120s 130s attenuations 170s. He has noticed his abdomen is distended significantly but denies any lower extremity swelling or orthopnea. Patient denies any visual changes and has endorsed the swelling has gone down since time antibiotics. Review of Systems   HENT:  Positive for congestion, facial swelling and sinus pressure. Respiratory:  Positive for cough. All other systems reviewed and are negative. MEDICATIONS:  Prior to Visit Medications    Medication Sig Taking?  Authorizing Provider   amoxicillin-clavulanate (AUGMENTIN) 875-125 MG per tablet Take 1 tablet by mouth 2 times daily for 7 days  FAM Reyes CNP   naproxen (NAPROSYN) 500 MG tablet Take 1 tablet by mouth 2 times daily (with meals) for 7 days  FAM Reyes CNP   phenytoin (DILANTIN) 100 MG ER capsule TAKE 6 CAPSULES BY MOUTH EVERY DAY  Gerianne Grumet, DO   atorvastatin (LIPITOR) 40 MG tablet Take 0.5 tablets by mouth daily  Gerianne Grumet, DO   sildenafil (VIAGRA) 50 MG tablet Take 2 tablets by mouth daily as needed for Erectile Dysfunction (Take 1 hr prior to sexual activity) OK to substitute for generic brand if Viagara too expensive  Gerianne Grumet, DO   fluticasone (FLONASE) 50 MCG/ACT nasal spray 2 sprays by Each Nostril route daily  Juan Carlos Rock MD        There were no vitals filed for this visit. Estimated body mass index is 30 kg/m² as calculated from the following:    Height as of 12/14/22: 5' 4\" (1.626 m). Weight as of 12/14/22: 174 lb 12.8 oz (79.3 kg). Physical Exam  Constitutional:       Appearance: Normal appearance. He is obese. HENT:      Head: Normocephalic and atraumatic. Nose: Nose normal.      Mouth/Throat:      Pharynx: Oropharynx is clear. Eyes:      Extraocular Movements: Extraocular movements intact. Conjunctiva/sclera: Conjunctivae normal.      Pupils: Pupils are equal, round, and reactive to light. Cardiovascular:      Rate and Rhythm: Normal rate and regular rhythm. Pulses: Normal pulses. Heart sounds: Normal heart sounds. Pulmonary:      Effort: Pulmonary effort is normal.      Breath sounds: Normal breath sounds. Abdominal:      General: Abdomen is flat. Bowel sounds are normal. There is distension. Palpations: Abdomen is soft. Tenderness: There is no abdominal tenderness. Comments: No hepatosplenomegaly. No ascites. Increase in panus size. Musculoskeletal:         General: Normal range of motion. Right lower leg: No edema. Left lower leg: No edema. Skin:     General: Skin is warm. Capillary Refill: Capillary refill takes less than 2 seconds. Neurological:      General: No focal deficit present. Mental Status: He is alert and oriented to person, place, and time. Mental status is at baseline. ASSESSMENT/PLAN:   Left eye orbital Cellulitis  Pt had left eye orbital cellulitis that he went to urgent care who gave him 7 days of ampicillin with naproxen. His left eye has reduced in swelling. Continue taking the antibiotic if it gets worse please go back to the ED. Seizure disorder  Pt is compliant with phenytoin 600 mg daily, last seizure 2018 which was thought to be 2/2 dehydration    - continue phenytoin on current dose      HLD   Pt is compliant with lipitor. No reported side effects. Counseled patient about weight loss and to adjust his meals to reduce fast food. - continue lipitor 20 mg     Chronic Back pain  Pt takes aleve 1-2x  week, no loss of bladder/bowel control. Neuro exam WNL. CT cervical spine showed osteophytes on c5-c6 with mild degenerative disc disease. Tobacco use disorder   Pt smokes 1/2-1 ppd for the past 20 years.     -Counseled patient about importance of quitting. Pt will try to reduce his smoking.   -Low dose CT chest when age 50       Return in about 3 months (around 3/15/2023). The patient was staffed with the teaching attending: Dr. Flaco Mares. An electronic signature was used to authenticate this note.     --Leon Ruby MD

## 2022-12-15 NOTE — PATIENT INSTRUCTIONS
Please come back in 3 months. Continue taking current medications. Please obtain your lab work prior to coming in.

## 2022-12-15 NOTE — PATIENT INSTRUCTIONS
Antibiotics as prescribed. Smoking cessation is recommended   Monitor BP at home and call PCP if persistently elevated. ER if symptoms worsen or for any shortness of breath. Follow up with PCP tomorrow as scheduled.

## 2022-12-21 ENCOUNTER — TELEPHONE (OUTPATIENT)
Dept: INTERNAL MEDICINE CLINIC | Age: 47
End: 2022-12-21

## 2022-12-21 NOTE — TELEPHONE ENCOUNTER
FRANCISCO FROM [facility] STATED THE FORM REQUEST FOR STATEMENT OF PHYSICIAN NEED TO BE CORRECTED THEN RE-FAXED TO THE NUMBER ON THE FORM. CORRECTION TO # 4 A SHOULD BE THE ONLY BOX CHECKED IF PT CAN DRIVE.  FROM QUESTIONS ON #4.

## 2023-03-11 DIAGNOSIS — E78.5 HYPERLIPIDEMIA LDL GOAL <100: ICD-10-CM

## 2023-03-11 DIAGNOSIS — R56.9 SEIZURE (HCC): ICD-10-CM

## 2023-03-11 DIAGNOSIS — N52.8 OTHER MALE ERECTILE DYSFUNCTION: ICD-10-CM

## 2023-03-11 LAB
A/G RATIO: 1.6 (ref 1.1–2.2)
ALBUMIN SERPL-MCNC: 4.5 G/DL (ref 3.4–5)
ALP BLD-CCNC: 136 U/L (ref 40–129)
ALT SERPL-CCNC: 18 U/L (ref 10–40)
ANION GAP SERPL CALCULATED.3IONS-SCNC: 9 MMOL/L (ref 3–16)
AST SERPL-CCNC: 17 U/L (ref 15–37)
BASOPHILS ABSOLUTE: 0 K/UL (ref 0–0.2)
BASOPHILS RELATIVE PERCENT: 0.5 %
BILIRUB SERPL-MCNC: <0.2 MG/DL (ref 0–1)
BUN BLDV-MCNC: 16 MG/DL (ref 7–20)
CALCIUM SERPL-MCNC: 9.4 MG/DL (ref 8.3–10.6)
CHLORIDE BLD-SCNC: 102 MMOL/L (ref 99–110)
CHOLESTEROL, TOTAL: 219 MG/DL (ref 0–199)
CO2: 27 MMOL/L (ref 21–32)
CREAT SERPL-MCNC: 0.8 MG/DL (ref 0.9–1.3)
EOSINOPHILS ABSOLUTE: 0.2 K/UL (ref 0–0.6)
EOSINOPHILS RELATIVE PERCENT: 3 %
FOLATE: 4.32 NG/ML (ref 4.78–24.2)
GFR SERPL CREATININE-BSD FRML MDRD: >60 ML/MIN/{1.73_M2}
GLUCOSE BLD-MCNC: 96 MG/DL (ref 70–99)
HCT VFR BLD CALC: 43.2 % (ref 40.5–52.5)
HDLC SERPL-MCNC: 38 MG/DL (ref 40–60)
HEMOGLOBIN: 14.7 G/DL (ref 13.5–17.5)
LDL CHOLESTEROL CALCULATED: 159 MG/DL
LYMPHOCYTES ABSOLUTE: 1.7 K/UL (ref 1–5.1)
LYMPHOCYTES RELATIVE PERCENT: 24.8 %
MCH RBC QN AUTO: 30.9 PG (ref 26–34)
MCHC RBC AUTO-ENTMCNC: 34.1 G/DL (ref 31–36)
MCV RBC AUTO: 90.7 FL (ref 80–100)
MONOCYTES ABSOLUTE: 0.8 K/UL (ref 0–1.3)
MONOCYTES RELATIVE PERCENT: 11.3 %
NEUTROPHILS ABSOLUTE: 4 K/UL (ref 1.7–7.7)
NEUTROPHILS RELATIVE PERCENT: 60.4 %
PDW BLD-RTO: 14.8 % (ref 12.4–15.4)
PLATELET # BLD: 284 K/UL (ref 135–450)
PMV BLD AUTO: 8.1 FL (ref 5–10.5)
POTASSIUM SERPL-SCNC: 4.9 MMOL/L (ref 3.5–5.1)
PROSTATE SPECIFIC ANTIGEN: 1.17 NG/ML (ref 0–4)
RBC # BLD: 4.76 M/UL (ref 4.2–5.9)
SODIUM BLD-SCNC: 138 MMOL/L (ref 136–145)
TOTAL PROTEIN: 7.4 G/DL (ref 6.4–8.2)
TRIGL SERPL-MCNC: 108 MG/DL (ref 0–150)
TSH REFLEX: 1.75 UIU/ML (ref 0.27–4.2)
VITAMIN B-12: 622 PG/ML (ref 211–911)
VLDLC SERPL CALC-MCNC: 22 MG/DL
WBC # BLD: 6.7 K/UL (ref 4–11)

## 2023-03-16 ENCOUNTER — OFFICE VISIT (OUTPATIENT)
Dept: INTERNAL MEDICINE CLINIC | Age: 48
End: 2023-03-16
Payer: OTHER MISCELLANEOUS

## 2023-03-16 VITALS
SYSTOLIC BLOOD PRESSURE: 131 MMHG | BODY MASS INDEX: 28.7 KG/M2 | WEIGHT: 167.2 LBS | DIASTOLIC BLOOD PRESSURE: 88 MMHG | TEMPERATURE: 99 F | RESPIRATION RATE: 16 BRPM | OXYGEN SATURATION: 97 % | HEART RATE: 80 BPM

## 2023-03-16 DIAGNOSIS — F17.200 SMOKING: ICD-10-CM

## 2023-03-16 DIAGNOSIS — N52.8 OTHER MALE ERECTILE DYSFUNCTION: ICD-10-CM

## 2023-03-16 DIAGNOSIS — R56.9 SEIZURE (HCC): Primary | ICD-10-CM

## 2023-03-16 DIAGNOSIS — E78.2 MIXED HYPERLIPIDEMIA: ICD-10-CM

## 2023-03-16 DIAGNOSIS — K08.9 POOR DENTITION: ICD-10-CM

## 2023-03-16 DIAGNOSIS — Z79.899 HIGH RISK MEDICATION USE: ICD-10-CM

## 2023-03-16 PROCEDURE — 99213 OFFICE O/P EST LOW 20 MIN: CPT | Performed by: STUDENT IN AN ORGANIZED HEALTH CARE EDUCATION/TRAINING PROGRAM

## 2023-03-16 RX ORDER — SILDENAFIL 50 MG/1
100 TABLET, FILM COATED ORAL DAILY PRN
Qty: 30 TABLET | Refills: 2 | Status: SHIPPED | OUTPATIENT
Start: 2023-03-16 | End: 2023-04-30

## 2023-03-16 RX ORDER — PHENYTOIN SODIUM 100 MG/1
CAPSULE, EXTENDED RELEASE ORAL
Qty: 180 CAPSULE | Refills: 5 | Status: SHIPPED | OUTPATIENT
Start: 2023-03-16

## 2023-03-16 RX ORDER — ATORVASTATIN CALCIUM 20 MG/1
20 TABLET, FILM COATED ORAL DAILY
Qty: 90 TABLET | Refills: 3 | Status: SHIPPED | OUTPATIENT
Start: 2023-03-16

## 2023-03-16 ASSESSMENT — ENCOUNTER SYMPTOMS
WHEEZING: 0
VOMITING: 0
RHINORRHEA: 1
ABDOMINAL PAIN: 0
SINUS PRESSURE: 1
CHEST TIGHTNESS: 0
SHORTNESS OF BREATH: 0
CONSTIPATION: 0
COUGH: 1
DIARRHEA: 0
NAUSEA: 0

## 2023-03-16 NOTE — PROGRESS NOTES
[facility]    Jim Nair is a 41 y.o. male, here for evaluation of the following concerns: Routine follow up    HPI  42 y.o. male PMH HLD, ED, seizure   Last seizure 2018  Currently on Phenytoin 600mg Daily   Tolerating well  CMP and CBC wnl  Does not see neurologist. Seizure has been stable  Walks with job. Works in [employer]. Moderately active job    Current smoker, less than a pack now   Slowly weaning down     The 10-year ASCVD risk score (Jana DK, et al., 2019) is: 9.2%    Values used to calculate the score:      Age: 41 years      Sex: Male      Is Non- : No      Diabetic: No      Tobacco smoker: Yes      Systolic Blood Pressure: 543 mmHg      Is BP treated: No      HDL Cholesterol: 38 mg/dL      Total Cholesterol: 219 mg/dL    Review of Systems   Constitutional:  Negative for activity change, appetite change, chills, fatigue, fever and unexpected weight change. HENT:  Positive for congestion, postnasal drip, rhinorrhea, sinus pressure and sneezing. Respiratory:  Positive for cough (mostly clear. occasionly yellow). Negative for chest tightness, shortness of breath and wheezing. Cardiovascular:  Negative for chest pain, palpitations and leg swelling. Gastrointestinal:  Negative for abdominal pain, constipation, diarrhea, nausea and vomiting. Genitourinary: Negative. Neurological:  Negative for dizziness, tremors, seizures, weakness and headaches. MEDICATIONS:  Prior to Visit Medications    Medication Sig Taking?  Authorizing Provider   phenytoin (DILANTIN) 100 MG ER capsule TAKE 6 CAPSULES BY MOUTH EVERY DAY Yes Millicent Thomas MD   sildenafil (VIAGRA) 50 MG tablet Take 2 tablets by mouth daily as needed for Erectile Dysfunction (Take 1 hr prior to sexual activity) OK to substitute for generic brand if Daniel Gurney too expensive Yes Millicent Thomas MD   atorvastatin (LIPITOR) 20 MG tablet Take 1 tablet by mouth daily Yes Shauna Arthur MD   fluticasone [facility]) 50 MCG/ACT nasal spray 2 sprays by Each Nostril route daily Yes Luis Stringer MD   naproxen (NAPROSYN) 500 MG tablet Take 1 tablet by mouth 2 times daily (with meals) for 7 days  Edgodfrey GerberFAM - CNP      Vitals:    03/16/23 1517   BP: 131/88   Site: Left Upper Arm   Position: Sitting   Cuff Size: Medium Adult   Pulse: 80   Resp: 16   Temp: 99 °F (37.2 °C)   TempSrc: Temporal   SpO2: 97%   Weight: 167 lb 3.2 oz (75.8 kg)      Estimated body mass index is 28.7 kg/m² as calculated from the following:    Height as of 12/15/22: 5' 4\" (1.626 m). Weight as of this encounter: 167 lb 3.2 oz (75.8 kg). Physical Exam  Constitutional:       General: He is not in acute distress. Appearance: Normal appearance. He is not ill-appearing, toxic-appearing or diaphoretic. HENT:      Mouth/Throat:      Comments: Gingival hyperplasia in lower jaw  Very poor dentition  Eyes:      Pupils: Pupils are equal, round, and reactive to light. Cardiovascular:      Rate and Rhythm: Normal rate and regular rhythm. Pulses: Normal pulses. Heart sounds: Normal heart sounds. No murmur heard. Pulmonary:      Effort: Pulmonary effort is normal. No respiratory distress. Breath sounds: Normal breath sounds. Abdominal:      General: Bowel sounds are normal. There is no distension. Palpations: Abdomen is soft. Musculoskeletal:         General: No swelling. Neurological:      Mental Status: He is alert and oriented to person, place, and time. Mental status is at baseline. ASSESSMENT/PLAN:     1.  Seizure ([facility] [address]  Has some gingival hyperplasia of lower jaw  Pt reports the the neurologist had difficulty in finding any anti seizure regimen to control his seizure, phenytoin was the one that finally worked  Pt has not seen his neurologist in a while   Advised patient to see dentist when able  Advised pt to keep an eye on the hyperplasia, if getting worse will need to go to neurologist     -     phenytoin (DILANTIN) 100 MG ER capsule; TAKE 6 CAPSULES BY MOUTH EVERY DAY, Disp-180 capsule, R-5Normal    2. Smoking   Cut down to 1/2 ppd from 1ppd. Planning to gradually wean off   Advised combining nicotine patch and lozneges to help with quitting    Pt reports that he is planning to quit on his own. Does not need help    3. High risk medication use  LFT and CBC wnl while on Phenytoin     4. Mixed hyperlipidemia  ASCVD score 8.7%  Pt was taking his lipitor infrequently   Lipitor levels will be elevated while taking concomitantly with phenytoin  Advised to start taking lipitor daily  Educated on side effects such as myalgia and fatigue  LFT wnl     -     atorvastatin (LIPITOR) 20 MG tablet; Take 1 tablet by mouth daily, Disp-90 tablet, R-3Normal    5. Other male erectile dysfunction  -     sildenafil (VIAGRA) 50 MG tablet; Take 2 tablets by mouth daily as needed for Erectile Dysfunction (Take 1 hr prior to sexual activity) OK to substitute for generic brand if Viagara too expensive, Disp-30 tablet, R-2Normal    6. Poor dentition  Advised pt to see dentist when able. He reports that he can't afford it at the moment. Return in about 6 months (around 9/16/2023) for High cholesterol. The patient was staffed with teaching attending: Dr. Flaco Mares. An electronic signature was used to authenticate this note.     --Ellis Erazo MD

## 2023-09-18 SDOH — ECONOMIC STABILITY: INCOME INSECURITY: HOW HARD IS IT FOR YOU TO PAY FOR THE VERY BASICS LIKE FOOD, HOUSING, MEDICAL CARE, AND HEATING?: NOT VERY HARD

## 2023-09-18 SDOH — ECONOMIC STABILITY: FOOD INSECURITY: WITHIN THE PAST 12 MONTHS, THE FOOD YOU BOUGHT JUST DIDN'T LAST AND YOU DIDN'T HAVE MONEY TO GET MORE.: NEVER TRUE

## 2023-09-18 SDOH — ECONOMIC STABILITY: FOOD INSECURITY: WITHIN THE PAST 12 MONTHS, YOU WORRIED THAT YOUR FOOD WOULD RUN OUT BEFORE YOU GOT MONEY TO BUY MORE.: NEVER TRUE

## 2023-09-18 SDOH — ECONOMIC STABILITY: HOUSING INSECURITY
IN THE LAST 12 MONTHS, WAS THERE A TIME WHEN YOU DID NOT HAVE A STEADY PLACE TO SLEEP OR SLEPT IN A SHELTER (INCLUDING NOW)?: NO

## 2023-09-18 SDOH — ECONOMIC STABILITY: TRANSPORTATION INSECURITY
IN THE PAST 12 MONTHS, HAS LACK OF TRANSPORTATION KEPT YOU FROM MEETINGS, WORK, OR FROM GETTING THINGS NEEDED FOR DAILY LIVING?: NO

## 2023-09-18 ASSESSMENT — PATIENT HEALTH QUESTIONNAIRE - PHQ9
1. LITTLE INTEREST OR PLEASURE IN DOING THINGS: NOT AT ALL
SUM OF ALL RESPONSES TO PHQ QUESTIONS 1-9: 0
2. FEELING DOWN, DEPRESSED OR HOPELESS: NOT AT ALL
SUM OF ALL RESPONSES TO PHQ QUESTIONS 1-9: 0
1. LITTLE INTEREST OR PLEASURE IN DOING THINGS: 0
2. FEELING DOWN, DEPRESSED OR HOPELESS: 0
SUM OF ALL RESPONSES TO PHQ QUESTIONS 1-9: 0
SUM OF ALL RESPONSES TO PHQ9 QUESTIONS 1 & 2: 0
SUM OF ALL RESPONSES TO PHQ9 QUESTIONS 1 & 2: 0
SUM OF ALL RESPONSES TO PHQ QUESTIONS 1-9: 0

## 2023-09-21 ENCOUNTER — OFFICE VISIT (OUTPATIENT)
Dept: INTERNAL MEDICINE CLINIC | Age: 48
End: 2023-09-21
Payer: OTHER MISCELLANEOUS

## 2023-09-21 VITALS
RESPIRATION RATE: 16 BRPM | BODY MASS INDEX: 27.79 KG/M2 | TEMPERATURE: 98.7 F | HEART RATE: 84 BPM | WEIGHT: 162.8 LBS | OXYGEN SATURATION: 97 % | HEIGHT: 64 IN | DIASTOLIC BLOOD PRESSURE: 83 MMHG | SYSTOLIC BLOOD PRESSURE: 120 MMHG

## 2023-09-21 DIAGNOSIS — F17.200 SMOKING: ICD-10-CM

## 2023-09-21 DIAGNOSIS — R09.81 SINUS CONGESTION: Primary | ICD-10-CM

## 2023-09-21 DIAGNOSIS — E78.2 MIXED HYPERLIPIDEMIA: ICD-10-CM

## 2023-09-21 DIAGNOSIS — R56.9 SEIZURE (HCC): ICD-10-CM

## 2023-09-21 DIAGNOSIS — N52.8 OTHER MALE ERECTILE DYSFUNCTION: ICD-10-CM

## 2023-09-21 DIAGNOSIS — K12.2: ICD-10-CM

## 2023-09-21 PROCEDURE — 99213 OFFICE O/P EST LOW 20 MIN: CPT

## 2023-09-21 RX ORDER — NAPROXEN 500 MG/1
500 TABLET ORAL 2 TIMES DAILY WITH MEALS
Qty: 14 TABLET | Refills: 0 | Status: SHIPPED | OUTPATIENT
Start: 2023-09-21 | End: 2023-09-28

## 2023-09-21 RX ORDER — SILDENAFIL 50 MG/1
100 TABLET, FILM COATED ORAL DAILY PRN
Qty: 30 TABLET | Refills: 2 | Status: SHIPPED | OUTPATIENT
Start: 2023-09-21 | End: 2023-11-05

## 2023-09-21 RX ORDER — LORATADINE AND PSEUDOEPHEDRINE SULFATE 5; 120 MG/1; MG/1
1 TABLET, EXTENDED RELEASE ORAL 2 TIMES DAILY
Qty: 10 TABLET | Refills: 0 | Status: SHIPPED | OUTPATIENT
Start: 2023-09-21

## 2023-09-21 RX ORDER — PHENYTOIN SODIUM 100 MG/1
CAPSULE, EXTENDED RELEASE ORAL
Qty: 180 CAPSULE | Refills: 5 | Status: SHIPPED | OUTPATIENT
Start: 2023-09-21

## 2023-09-21 NOTE — PROGRESS NOTES
Outpatient Clinic Established Patient Note    Patient: Jameson Burrell  : 1975 (41 y.o.)  Date: 2023    CC: Follow up     HPI:    Patient is a 41years old male with past medical history of hyperlipidemia, ED, seizure who presented to the clinic for a follow-up visit. Patient last seizure was in 2018 and is currently on phenytoin 600 mg daily which he is compliant with but has not been following up with the neurologist.  Recent lab work was normal.  Of note patient is a current smoker with 1 pack/day for over 20 years and has been trying to slowly wean down. Last lab work was significant for total cholesterol of 219 with an ASCVD score of 9.2%. Patient states of feeling well overall except for some sinus congestion for the past 2 weeks for which she has been taking Advil cold and sinus, DayQuil with minimal relief. He does have a small wound on lateral side of his left index finger after he got injured from a power tool, states that it has some serous discharge coming out occasionally with some itching in the surrounding area. Denies of any other acute complaints. Home Meds:  Prior to Visit Medications    Medication Sig Taking?  Authorizing Provider   loratadine-pseudoephedrine (CLARITIN-D 12 HOUR) 5-120 MG per extended release tablet Take 1 tablet by mouth 2 times daily Yes Reece Kern MD   phenytoin (DILANTIN) 100 MG ER capsule TAKE 6 CAPSULES BY MOUTH EVERY DAY  Flores Stone MD   sildenafil (VIAGRA) 50 MG tablet Take 2 tablets by mouth daily as needed for Erectile Dysfunction (Take 1 hr prior to sexual activity) OK to substitute for generic brand if Viagara too expensive  Flores Stone MD   atorvastatin (LIPITOR) 20 MG tablet Take 1 tablet by mouth daily  Flores Stone MD   naproxen (NAPROSYN) 500 MG tablet Take 1 tablet by mouth 2 times daily (with meals) for 7 days  FAM Guerrero - CNP   fluticasone (FLONASE) 50 MCG/ACT nasal spray 2 sprays by Each Nostril route

## 2024-04-09 ENCOUNTER — OFFICE VISIT (OUTPATIENT)
Dept: INTERNAL MEDICINE CLINIC | Age: 49
End: 2024-04-09
Payer: COMMERCIAL

## 2024-04-09 VITALS
BODY MASS INDEX: 27.34 KG/M2 | WEIGHT: 159.3 LBS | HEART RATE: 74 BPM | SYSTOLIC BLOOD PRESSURE: 125 MMHG | DIASTOLIC BLOOD PRESSURE: 79 MMHG | RESPIRATION RATE: 16 BRPM | TEMPERATURE: 98.8 F | OXYGEN SATURATION: 97 %

## 2024-04-09 DIAGNOSIS — N52.8 OTHER MALE ERECTILE DYSFUNCTION: ICD-10-CM

## 2024-04-09 DIAGNOSIS — R56.9 SEIZURE (HCC): ICD-10-CM

## 2024-04-09 DIAGNOSIS — E78.2 MIXED HYPERLIPIDEMIA: ICD-10-CM

## 2024-04-09 PROCEDURE — 99213 OFFICE O/P EST LOW 20 MIN: CPT

## 2024-04-09 RX ORDER — ATORVASTATIN CALCIUM 20 MG/1
20 TABLET, FILM COATED ORAL DAILY
Qty: 90 TABLET | Refills: 3 | Status: SHIPPED | OUTPATIENT
Start: 2024-04-09

## 2024-04-09 RX ORDER — PHENYTOIN SODIUM 100 MG/1
CAPSULE, EXTENDED RELEASE ORAL
Qty: 180 CAPSULE | Refills: 5 | Status: SHIPPED | OUTPATIENT
Start: 2024-04-09

## 2024-04-09 RX ORDER — SILDENAFIL 50 MG/1
100 TABLET, FILM COATED ORAL DAILY PRN
Qty: 30 TABLET | Refills: 2 | Status: SHIPPED | OUTPATIENT
Start: 2024-04-09 | End: 2024-05-24

## 2024-04-09 ASSESSMENT — PATIENT HEALTH QUESTIONNAIRE - PHQ9
2. FEELING DOWN, DEPRESSED OR HOPELESS: NOT AT ALL
SUM OF ALL RESPONSES TO PHQ QUESTIONS 1-9: 0
SUM OF ALL RESPONSES TO PHQ QUESTIONS 1-9: 0
1. LITTLE INTEREST OR PLEASURE IN DOING THINGS: NOT AT ALL
SUM OF ALL RESPONSES TO PHQ9 QUESTIONS 1 & 2: 0
SUM OF ALL RESPONSES TO PHQ QUESTIONS 1-9: 0
SUM OF ALL RESPONSES TO PHQ QUESTIONS 1-9: 0

## 2024-04-09 ASSESSMENT — ENCOUNTER SYMPTOMS
GASTROINTESTINAL NEGATIVE: 1
EYES NEGATIVE: 1
RESPIRATORY NEGATIVE: 1

## 2024-04-09 NOTE — PATIENT INSTRUCTIONS
Please come back in 3 months  Try to stop smoking  Please get your lab work 1 week before coming in to the clinic next time

## 2024-04-09 NOTE — PROGRESS NOTES
Outpatient Clinic Established Patient Note    Patient: Alex Gonzalez  : 1975 (48 y.o.)  Date: 2024    CC: Follow up     HPI:    Pt is a 46 y.o. male PMH HLD, ED, seizure who comes in for office visit. Patient has no complaints. He is compliant with his medications with no side effects. He has no seizures or symptoms of it since 2018.  He still remains smoking but cut down to half a pack a day. He denies any ETOH use or illicit drug use. He works at [employer] as a  doing a lot of physical labour. He has noted his back pain has improved since being seen last time. He has good bowel movements and no urinary problems. Mood is good without any issues. Denies any issues. He does endorse a cough that is productive that is chronic which he attributed to his cough.       Home Meds:  Prior to Visit Medications    Medication Sig Taking? Authorizing Provider   phenytoin (DILANTIN) 100 MG ER capsule TAKE 6 CAPSULES BY MOUTH EVERY DAY Yes Keeley Oh MD   sildenafil (VIAGRA) 50 MG tablet Take 2 tablets by mouth daily as needed for Erectile Dysfunction (Take 1 hr prior to sexual activity) OK to substitute for generic brand if Viagara too expensive Yes Keeley Oh MD   atorvastatin (LIPITOR) 20 MG tablet Take 1 tablet by mouth daily Yes Keeley Oh MD   loratadine-pseudoephedrine (CLARITIN-D 12 HOUR) 5-120 MG per extended release tablet Take 1 tablet by mouth 2 times daily Yes Jessica Schmitz MD   fluticasone (FLONASE) 50 MCG/ACT nasal spray 2 sprays by Each Nostril route daily Yes Naomy Donaldson MD   NONFORMULARY otc sinus med  Provider, MD Sheila   naproxen (NAPROSYN) 500 MG tablet Take 1 tablet by mouth 2 times daily (with meals) for 7 days  Jessica Schmitz MD       Allergies:    Patient has no known allergies.    Health Maintenance Due   Topic Date Due    Hepatitis B vaccine (1 of 3 - 3-dose series) Never done    COVID-19 Vaccine (1) Never done    Pneumococcal 0-64 years Vaccine (2 of 2 -

## 2024-05-13 DIAGNOSIS — N52.8 OTHER MALE ERECTILE DYSFUNCTION: ICD-10-CM

## 2024-05-13 RX ORDER — SILDENAFIL 50 MG/1
100 TABLET, FILM COATED ORAL DAILY PRN
Qty: 30 TABLET | Refills: 2 | Status: SHIPPED | OUTPATIENT
Start: 2024-05-13 | End: 2024-06-27

## 2024-05-13 NOTE — TELEPHONE ENCOUNTER
Requested Prescriptions     Pending Prescriptions Disp Refills    sildenafil (VIAGRA) 50 MG tablet 30 tablet 2     Sig: Take 2 tablets by mouth daily as needed for Erectile Dysfunction (Take 1 hr prior to sexual activity) OK to substitute for generic brand if Viagara too expensive       Last Clinic Visit:  4/9/2024     Next Clinic Appointment:  7/16/2024

## 2024-07-12 DIAGNOSIS — R56.9 SEIZURE (HCC): ICD-10-CM

## 2024-07-12 DIAGNOSIS — E78.2 MIXED HYPERLIPIDEMIA: ICD-10-CM

## 2024-07-12 LAB
ALBUMIN SERPL-MCNC: 4.8 G/DL (ref 3.4–5)
ALBUMIN/GLOB SERPL: 1.5 {RATIO} (ref 1.1–2.2)
ALP SERPL-CCNC: 153 U/L (ref 40–129)
ALT SERPL-CCNC: 20 U/L (ref 10–40)
ANION GAP SERPL CALCULATED.3IONS-SCNC: 10 MMOL/L (ref 3–16)
AST SERPL-CCNC: 20 U/L (ref 15–37)
BASOPHILS # BLD: 0 K/UL (ref 0–0.2)
BASOPHILS NFR BLD: 0.5 %
BILIRUB SERPL-MCNC: <0.2 MG/DL (ref 0–1)
BUN SERPL-MCNC: 21 MG/DL (ref 7–20)
CALCIUM SERPL-MCNC: 9.6 MG/DL (ref 8.3–10.6)
CHLORIDE SERPL-SCNC: 103 MMOL/L (ref 99–110)
CHOLEST SERPL-MCNC: 226 MG/DL (ref 0–199)
CO2 SERPL-SCNC: 27 MMOL/L (ref 21–32)
CREAT SERPL-MCNC: 0.8 MG/DL (ref 0.9–1.3)
DEPRECATED RDW RBC AUTO: 14.6 % (ref 12.4–15.4)
EOSINOPHIL # BLD: 0.1 K/UL (ref 0–0.6)
EOSINOPHIL NFR BLD: 1.8 %
FOLATE SERPL-MCNC: 5.15 NG/ML (ref 4.78–24.2)
GFR SERPLBLD CREATININE-BSD FMLA CKD-EPI: >90 ML/MIN/{1.73_M2}
GLUCOSE SERPL-MCNC: 102 MG/DL (ref 70–99)
HCT VFR BLD AUTO: 44.1 % (ref 40.5–52.5)
HDLC SERPL-MCNC: 39 MG/DL (ref 40–60)
HGB BLD-MCNC: 15.3 G/DL (ref 13.5–17.5)
LDLC SERPL CALC-MCNC: 169 MG/DL
LYMPHOCYTES # BLD: 1.7 K/UL (ref 1–5.1)
LYMPHOCYTES NFR BLD: 24.7 %
MCH RBC QN AUTO: 32.1 PG (ref 26–34)
MCHC RBC AUTO-ENTMCNC: 34.8 G/DL (ref 31–36)
MCV RBC AUTO: 92.2 FL (ref 80–100)
MONOCYTES # BLD: 0.6 K/UL (ref 0–1.3)
MONOCYTES NFR BLD: 9.2 %
NEUTROPHILS # BLD: 4.5 K/UL (ref 1.7–7.7)
NEUTROPHILS NFR BLD: 63.8 %
PHENYTOIN DOSE: ABNORMAL MG
PHENYTOIN SERPL-MCNC: 24.9 UG/ML (ref 10–20)
PLATELET # BLD AUTO: 287 K/UL (ref 135–450)
PMV BLD AUTO: 8.5 FL (ref 5–10.5)
POTASSIUM SERPL-SCNC: 4.8 MMOL/L (ref 3.5–5.1)
PROT SERPL-MCNC: 8 G/DL (ref 6.4–8.2)
RBC # BLD AUTO: 4.78 M/UL (ref 4.2–5.9)
SODIUM SERPL-SCNC: 140 MMOL/L (ref 136–145)
TRIGL SERPL-MCNC: 92 MG/DL (ref 0–150)
VIT B12 SERPL-MCNC: 528 PG/ML (ref 211–911)
VLDLC SERPL CALC-MCNC: 18 MG/DL
WBC # BLD AUTO: 7 K/UL (ref 4–11)

## 2024-07-16 ENCOUNTER — OFFICE VISIT (OUTPATIENT)
Dept: INTERNAL MEDICINE CLINIC | Age: 49
End: 2024-07-16
Payer: COMMERCIAL

## 2024-07-16 VITALS
WEIGHT: 153.6 LBS | TEMPERATURE: 97.2 F | HEART RATE: 75 BPM | OXYGEN SATURATION: 98 % | SYSTOLIC BLOOD PRESSURE: 115 MMHG | DIASTOLIC BLOOD PRESSURE: 79 MMHG | RESPIRATION RATE: 16 BRPM | BODY MASS INDEX: 26.37 KG/M2

## 2024-07-16 DIAGNOSIS — R56.9 SEIZURE (HCC): ICD-10-CM

## 2024-07-16 DIAGNOSIS — E78.2 MIXED HYPERLIPIDEMIA: Primary | ICD-10-CM

## 2024-07-16 DIAGNOSIS — N52.8 OTHER MALE ERECTILE DYSFUNCTION: ICD-10-CM

## 2024-07-16 DIAGNOSIS — K12.2: ICD-10-CM

## 2024-07-16 PROCEDURE — 99213 OFFICE O/P EST LOW 20 MIN: CPT

## 2024-07-16 RX ORDER — SILDENAFIL 50 MG/1
50 TABLET, FILM COATED ORAL DAILY PRN
Qty: 30 TABLET | Refills: 1 | Status: SHIPPED | OUTPATIENT
Start: 2024-07-16 | End: 2024-09-14

## 2024-07-16 RX ORDER — PHENYTOIN SODIUM 100 MG/1
CAPSULE, EXTENDED RELEASE ORAL
Qty: 180 CAPSULE | Refills: 5 | Status: SHIPPED | OUTPATIENT
Start: 2024-07-16

## 2024-07-16 RX ORDER — NAPROXEN 500 MG/1
500 TABLET ORAL 2 TIMES DAILY WITH MEALS
Qty: 30 TABLET | Refills: 0 | Status: SHIPPED | OUTPATIENT
Start: 2024-07-16 | End: 2024-07-31

## 2024-07-16 RX ORDER — NAPROXEN 500 MG/1
500 TABLET ORAL 2 TIMES DAILY WITH MEALS
Qty: 30 TABLET | Refills: 0 | Status: SHIPPED | OUTPATIENT
Start: 2024-07-16 | End: 2024-07-16

## 2024-07-16 RX ORDER — SILDENAFIL 50 MG/1
50 TABLET, FILM COATED ORAL DAILY PRN
Qty: 30 TABLET | Refills: 1 | Status: SHIPPED | OUTPATIENT
Start: 2024-07-16 | End: 2024-07-16

## 2024-07-16 RX ORDER — ROSUVASTATIN CALCIUM 20 MG/1
20 TABLET, COATED ORAL NIGHTLY
Qty: 30 TABLET | Refills: 2 | Status: SHIPPED | OUTPATIENT
Start: 2024-07-16

## 2024-07-16 RX ORDER — PHENYTOIN SODIUM 100 MG/1
CAPSULE, EXTENDED RELEASE ORAL
Qty: 180 CAPSULE | Refills: 5 | Status: SHIPPED | OUTPATIENT
Start: 2024-07-16 | End: 2024-07-16

## 2024-07-16 ASSESSMENT — ENCOUNTER SYMPTOMS
CHEST TIGHTNESS: 0
BACK PAIN: 0
SORE THROAT: 0
RHINORRHEA: 0
CONSTIPATION: 0
EYE PAIN: 0
SHORTNESS OF BREATH: 0
COLOR CHANGE: 0
WHEEZING: 0
ABDOMINAL PAIN: 0
EYE REDNESS: 0
COUGH: 0
BLOOD IN STOOL: 0
VOMITING: 0

## 2024-07-16 NOTE — PROGRESS NOTES
normal.       ASSESSMENT & PLAN   Seizure Disorder  He is compliant with phenytoin 60 mg daily.  Last seizure 2018, no seizure since then.  Neurological exam normal. Phenytoin level was above normal limits 24.9. This is because he took his phenytoin dose a night before the his fasting blood draw.  -Continue phenytoin 600 mg daily.    Hyperlipidemia  Patient is not taking Lipitor regularly, this is because of he is having fatigue with that.  Lipid profile a week ago shows abnormal result. Total cholesterol of 226, .    Side effects of Lipitor discussed with patient.    Switched to Crestor   -Continue Crestor 20 mg daily.    Tobacco use disorder  Patient smokes about a half a pack of cigarettes daily.  He is gradually cutting down.  Counseled about quitting.    Erectile dysfunction  - Cont. Viagra as needed    Follow up after 3 months.    The patient was staffed with teaching attending: Dr. Govind Cardenas.    An electronic signature was used to authenticate this note.    Solo Ragland MD.  Internal Medicine, PGY-1

## 2024-07-16 NOTE — PATIENT INSTRUCTIONS
Stop Lipitor because of fatigue and start Crestor 20mg once a daily    Please follow up after 3 months

## 2024-10-31 ENCOUNTER — OFFICE VISIT (OUTPATIENT)
Dept: INTERNAL MEDICINE CLINIC | Age: 49
End: 2024-10-31
Payer: COMMERCIAL

## 2024-10-31 VITALS
TEMPERATURE: 98.2 F | OXYGEN SATURATION: 97 % | HEIGHT: 64 IN | HEART RATE: 78 BPM | DIASTOLIC BLOOD PRESSURE: 82 MMHG | SYSTOLIC BLOOD PRESSURE: 122 MMHG | BODY MASS INDEX: 24.92 KG/M2 | WEIGHT: 146 LBS | RESPIRATION RATE: 16 BRPM

## 2024-10-31 DIAGNOSIS — R56.9 SEIZURE (HCC): ICD-10-CM

## 2024-10-31 DIAGNOSIS — J01.00 ACUTE MAXILLARY SINUSITIS, RECURRENCE NOT SPECIFIED: ICD-10-CM

## 2024-10-31 DIAGNOSIS — N52.8 OTHER MALE ERECTILE DYSFUNCTION: ICD-10-CM

## 2024-10-31 DIAGNOSIS — E78.2 MIXED HYPERLIPIDEMIA: ICD-10-CM

## 2024-10-31 DIAGNOSIS — K12.2: ICD-10-CM

## 2024-10-31 PROCEDURE — 99213 OFFICE O/P EST LOW 20 MIN: CPT

## 2024-10-31 RX ORDER — NAPROXEN 500 MG/1
500 TABLET ORAL 2 TIMES DAILY WITH MEALS
Qty: 30 TABLET | Refills: 0 | Status: SHIPPED | OUTPATIENT
Start: 2024-10-31 | End: 2024-11-15

## 2024-10-31 RX ORDER — PHENYTOIN SODIUM 100 MG/1
CAPSULE, EXTENDED RELEASE ORAL
Qty: 180 CAPSULE | Refills: 5 | Status: SHIPPED | OUTPATIENT
Start: 2024-10-31

## 2024-10-31 RX ORDER — ROSUVASTATIN CALCIUM 20 MG/1
20 TABLET, COATED ORAL NIGHTLY
Qty: 30 TABLET | Refills: 2 | Status: SHIPPED | OUTPATIENT
Start: 2024-10-31

## 2024-10-31 RX ORDER — FLUTICASONE PROPIONATE 50 MCG
2 SPRAY, SUSPENSION (ML) NASAL DAILY
Qty: 3 EACH | Refills: 1 | Status: SHIPPED | OUTPATIENT
Start: 2024-10-31

## 2024-10-31 RX ORDER — SILDENAFIL 50 MG/1
50 TABLET, FILM COATED ORAL DAILY PRN
Qty: 30 TABLET | Refills: 1 | Status: SHIPPED | OUTPATIENT
Start: 2024-10-31 | End: 2024-12-30

## 2024-10-31 ASSESSMENT — ENCOUNTER SYMPTOMS
SHORTNESS OF BREATH: 0
RHINORRHEA: 0
SORE THROAT: 0
COLOR CHANGE: 0
ABDOMINAL PAIN: 0
BACK PAIN: 0
CHEST TIGHTNESS: 0
EYE PAIN: 0
WHEEZING: 0
VOMITING: 0
CONSTIPATION: 0
BLOOD IN STOOL: 0
EYE REDNESS: 0
COUGH: 0

## 2024-10-31 NOTE — PROGRESS NOTES
generic brand if Viagara too expensive Yes Solo Ragland MD   rosuvastatin (CRESTOR) 20 MG tablet Take 1 tablet by mouth nightly Yes Solo Ragland MD   phenytoin (DILANTIN) 100 MG ER capsule TAKE 6 CAPSULES BY MOUTH EVERY DAY Yes Solo Ragland MD   fluticasone (FLONASE) 50 MCG/ACT nasal spray 2 sprays by Each Nostril route daily Yes Solo Ragland MD   naproxen (NAPROSYN) 500 MG tablet Take 1 tablet by mouth 2 times daily (with meals) for 15 days Yes Solo Ragland MD   NONFORMULARY otc sinus med  Provider, MD Sheila        VITALS:  /82, HR 78, SpO2 99% on RA.     Estimated body mass index is 26.37 kg/m² as calculated from the following:    Height as of 9/21/23: 1.626 m (5' 4\").    Weight as of 7/16/24: 69.7 kg (153 lb 9.6 oz).  Physical Exam  Constitutional:       Appearance: Normal appearance.   HENT:      Head: Normocephalic and atraumatic.      Right Ear: External ear normal.      Left Ear: External ear normal.      Nose: Nose normal.      Mouth/Throat:      Mouth: Mucous membranes are moist.      Pharynx: Oropharynx is clear.   Eyes:      Extraocular Movements: Extraocular movements intact.      Conjunctiva/sclera: Conjunctivae normal.      Pupils: Pupils are equal, round, and reactive to light.   Cardiovascular:      Rate and Rhythm: Normal rate.      Pulses: Normal pulses.      Heart sounds: Normal heart sounds.   Pulmonary:      Effort: Pulmonary effort is normal.      Breath sounds: Normal breath sounds.   Abdominal:      General: Bowel sounds are normal.      Palpations: Abdomen is soft.   Musculoskeletal:         General: Normal range of motion.      Cervical back: Normal range of motion and neck supple.   Skin:     General: Skin is warm and dry.      Comments: Mechanical scratch marks on bilateral palms.   Neurological:      General: No focal deficit present.      Mental Status: He is alert and oriented to person, place, and time.   Psychiatric:         Mood and Affect: Mood

## 2024-10-31 NOTE — PATIENT INSTRUCTIONS
- Please take your medications regularly.    - Please try to cut down your smoking.    - Take your Crestor daily.    - Please get your blood work done before your next visit.     - Follow up after 6 months.

## 2025-03-05 ENCOUNTER — APPOINTMENT (OUTPATIENT)
Dept: GENERAL RADIOLOGY | Age: 50
End: 2025-03-05
Payer: COMMERCIAL

## 2025-03-05 ENCOUNTER — APPOINTMENT (OUTPATIENT)
Dept: CT IMAGING | Age: 50
End: 2025-03-05
Payer: COMMERCIAL

## 2025-03-05 ENCOUNTER — APPOINTMENT (OUTPATIENT)
Dept: MRI IMAGING | Age: 50
End: 2025-03-05
Payer: COMMERCIAL

## 2025-03-05 ENCOUNTER — HOSPITAL ENCOUNTER (INPATIENT)
Age: 50
LOS: 1 days | Discharge: HOME | End: 2025-03-06
Attending: EMERGENCY MEDICINE | Admitting: STUDENT IN AN ORGANIZED HEALTH CARE EDUCATION/TRAINING PROGRAM
Payer: COMMERCIAL

## 2025-03-05 DIAGNOSIS — R56.9 SEIZURE (HCC): ICD-10-CM

## 2025-03-05 DIAGNOSIS — T42.0X1A ACCIDENTAL PHENYTOIN POISONING, INITIAL ENCOUNTER: Primary | ICD-10-CM

## 2025-03-05 LAB
ALBUMIN SERPL-MCNC: 4.4 G/DL (ref 3.4–5)
ALP SERPL-CCNC: 88 U/L (ref 40–129)
ALT SERPL-CCNC: 19 U/L (ref 10–40)
ANION GAP SERPL CALCULATED.3IONS-SCNC: 11 MMOL/L (ref 3–16)
AST SERPL-CCNC: 27 U/L (ref 15–37)
BASOPHILS # BLD: 0 K/UL (ref 0–0.2)
BASOPHILS NFR BLD: 0.1 %
BILIRUB DIRECT SERPL-MCNC: <0.1 MG/DL (ref 0–0.3)
BILIRUB INDIRECT SERPL-MCNC: 0.2 MG/DL (ref 0–1)
BILIRUB SERPL-MCNC: 0.3 MG/DL (ref 0–1)
BUN SERPL-MCNC: 18 MG/DL (ref 7–20)
CALCIUM SERPL-MCNC: 9.4 MG/DL (ref 8.3–10.6)
CHLORIDE SERPL-SCNC: 101 MMOL/L (ref 99–110)
CO2 SERPL-SCNC: 27 MMOL/L (ref 21–32)
CREAT SERPL-MCNC: 0.8 MG/DL (ref 0.9–1.3)
DEPRECATED RDW RBC AUTO: 14.9 % (ref 12.4–15.4)
EKG ATRIAL RATE: 77 BPM
EKG DIAGNOSIS: NORMAL
EKG P AXIS: 53 DEGREES
EKG P-R INTERVAL: 138 MS
EKG Q-T INTERVAL: 364 MS
EKG QRS DURATION: 92 MS
EKG QTC CALCULATION (BAZETT): 411 MS
EKG R AXIS: 69 DEGREES
EKG T AXIS: 33 DEGREES
EKG VENTRICULAR RATE: 77 BPM
EOSINOPHIL # BLD: 0.1 K/UL (ref 0–0.6)
EOSINOPHIL NFR BLD: 0.9 %
FOLATE SERPL-MCNC: 5.53 NG/ML (ref 4.78–24.2)
GFR SERPLBLD CREATININE-BSD FMLA CKD-EPI: >90 ML/MIN/{1.73_M2}
GLUCOSE SERPL-MCNC: 108 MG/DL (ref 70–99)
HCT VFR BLD AUTO: 44.1 % (ref 40.5–52.5)
HGB BLD-MCNC: 14.9 G/DL (ref 13.5–17.5)
LYMPHOCYTES # BLD: 1.1 K/UL (ref 1–5.1)
LYMPHOCYTES NFR BLD: 8.1 %
MCH RBC QN AUTO: 31.2 PG (ref 26–34)
MCHC RBC AUTO-ENTMCNC: 33.8 G/DL (ref 31–36)
MCV RBC AUTO: 92.2 FL (ref 80–100)
MONOCYTES # BLD: 0.9 K/UL (ref 0–1.3)
MONOCYTES NFR BLD: 6.6 %
NEUTROPHILS # BLD: 11.8 K/UL (ref 1.7–7.7)
NEUTROPHILS NFR BLD: 84.3 %
PHENYTOIN DOSE: ABNORMAL MG
PHENYTOIN DOSE: ABNORMAL MG
PHENYTOIN SERPL-MCNC: 38.9 UG/ML (ref 10–20)
PHENYTOIN SERPL-MCNC: >40 UG/ML (ref 10–20)
PLATELET # BLD AUTO: 248 K/UL (ref 135–450)
PMV BLD AUTO: 7.2 FL (ref 5–10.5)
POTASSIUM SERPL-SCNC: 3.8 MMOL/L (ref 3.5–5.1)
PROT SERPL-MCNC: 7.1 G/DL (ref 6.4–8.2)
RBC # BLD AUTO: 4.78 M/UL (ref 4.2–5.9)
SODIUM SERPL-SCNC: 139 MMOL/L (ref 136–145)
VIT B12 SERPL-MCNC: 344 PG/ML (ref 211–911)
WBC # BLD AUTO: 14 K/UL (ref 4–11)

## 2025-03-05 PROCEDURE — 71046 X-RAY EXAM CHEST 2 VIEWS: CPT

## 2025-03-05 PROCEDURE — 6360000002 HC RX W HCPCS: Performed by: EMERGENCY MEDICINE

## 2025-03-05 PROCEDURE — 70450 CT HEAD/BRAIN W/O DYE: CPT

## 2025-03-05 PROCEDURE — 36415 COLL VENOUS BLD VENIPUNCTURE: CPT

## 2025-03-05 PROCEDURE — 2060000000 HC ICU INTERMEDIATE R&B

## 2025-03-05 PROCEDURE — 82746 ASSAY OF FOLIC ACID SERUM: CPT

## 2025-03-05 PROCEDURE — 80076 HEPATIC FUNCTION PANEL: CPT

## 2025-03-05 PROCEDURE — 80185 ASSAY OF PHENYTOIN TOTAL: CPT

## 2025-03-05 PROCEDURE — 85025 COMPLETE CBC W/AUTO DIFF WBC: CPT

## 2025-03-05 PROCEDURE — 70551 MRI BRAIN STEM W/O DYE: CPT

## 2025-03-05 PROCEDURE — 70496 CT ANGIOGRAPHY HEAD: CPT

## 2025-03-05 PROCEDURE — 6370000000 HC RX 637 (ALT 250 FOR IP): Performed by: STUDENT IN AN ORGANIZED HEALTH CARE EDUCATION/TRAINING PROGRAM

## 2025-03-05 PROCEDURE — 6360000004 HC RX CONTRAST MEDICATION: Performed by: EMERGENCY MEDICINE

## 2025-03-05 PROCEDURE — 99222 1ST HOSP IP/OBS MODERATE 55: CPT

## 2025-03-05 PROCEDURE — 99285 EMERGENCY DEPT VISIT HI MDM: CPT

## 2025-03-05 PROCEDURE — 82607 VITAMIN B-12: CPT

## 2025-03-05 PROCEDURE — 96374 THER/PROPH/DIAG INJ IV PUSH: CPT

## 2025-03-05 PROCEDURE — 93005 ELECTROCARDIOGRAM TRACING: CPT | Performed by: EMERGENCY MEDICINE

## 2025-03-05 PROCEDURE — 80048 BASIC METABOLIC PNL TOTAL CA: CPT

## 2025-03-05 RX ORDER — SODIUM CHLORIDE 9 MG/ML
INJECTION, SOLUTION INTRAVENOUS PRN
Status: DISCONTINUED | OUTPATIENT
Start: 2025-03-05 | End: 2025-03-06 | Stop reason: HOSPADM

## 2025-03-05 RX ORDER — MAGNESIUM SULFATE IN WATER 40 MG/ML
2000 INJECTION, SOLUTION INTRAVENOUS PRN
Status: DISCONTINUED | OUTPATIENT
Start: 2025-03-05 | End: 2025-03-06 | Stop reason: HOSPADM

## 2025-03-05 RX ORDER — ONDANSETRON 4 MG/1
4 TABLET, ORALLY DISINTEGRATING ORAL EVERY 8 HOURS PRN
Status: DISCONTINUED | OUTPATIENT
Start: 2025-03-05 | End: 2025-03-06 | Stop reason: HOSPADM

## 2025-03-05 RX ORDER — SODIUM CHLORIDE, SODIUM LACTATE, POTASSIUM CHLORIDE, CALCIUM CHLORIDE 600; 310; 30; 20 MG/100ML; MG/100ML; MG/100ML; MG/100ML
INJECTION, SOLUTION INTRAVENOUS CONTINUOUS
Status: ACTIVE | OUTPATIENT
Start: 2025-03-05 | End: 2025-03-06

## 2025-03-05 RX ORDER — ACETAMINOPHEN 650 MG/1
650 SUPPOSITORY RECTAL EVERY 6 HOURS PRN
Status: DISCONTINUED | OUTPATIENT
Start: 2025-03-05 | End: 2025-03-06 | Stop reason: HOSPADM

## 2025-03-05 RX ORDER — IOPAMIDOL 755 MG/ML
75 INJECTION, SOLUTION INTRAVASCULAR
Status: COMPLETED | OUTPATIENT
Start: 2025-03-05 | End: 2025-03-05

## 2025-03-05 RX ORDER — ENOXAPARIN SODIUM 100 MG/ML
40 INJECTION SUBCUTANEOUS DAILY
Status: DISCONTINUED | OUTPATIENT
Start: 2025-03-05 | End: 2025-03-06 | Stop reason: HOSPADM

## 2025-03-05 RX ORDER — POTASSIUM CHLORIDE 1500 MG/1
40 TABLET, EXTENDED RELEASE ORAL PRN
Status: DISCONTINUED | OUTPATIENT
Start: 2025-03-05 | End: 2025-03-06 | Stop reason: HOSPADM

## 2025-03-05 RX ORDER — SODIUM CHLORIDE 0.9 % (FLUSH) 0.9 %
5-40 SYRINGE (ML) INJECTION PRN
Status: DISCONTINUED | OUTPATIENT
Start: 2025-03-05 | End: 2025-03-06 | Stop reason: HOSPADM

## 2025-03-05 RX ORDER — ROSUVASTATIN CALCIUM 20 MG/1
20 TABLET, COATED ORAL NIGHTLY
Status: DISCONTINUED | OUTPATIENT
Start: 2025-03-05 | End: 2025-03-06 | Stop reason: HOSPADM

## 2025-03-05 RX ORDER — ONDANSETRON 2 MG/ML
4 INJECTION INTRAMUSCULAR; INTRAVENOUS EVERY 6 HOURS PRN
Status: DISCONTINUED | OUTPATIENT
Start: 2025-03-05 | End: 2025-03-06 | Stop reason: HOSPADM

## 2025-03-05 RX ORDER — SODIUM CHLORIDE 0.9 % (FLUSH) 0.9 %
5-40 SYRINGE (ML) INJECTION EVERY 12 HOURS SCHEDULED
Status: DISCONTINUED | OUTPATIENT
Start: 2025-03-05 | End: 2025-03-06 | Stop reason: HOSPADM

## 2025-03-05 RX ORDER — KETOROLAC TROMETHAMINE 30 MG/ML
15 INJECTION, SOLUTION INTRAMUSCULAR; INTRAVENOUS ONCE
Status: COMPLETED | OUTPATIENT
Start: 2025-03-05 | End: 2025-03-05

## 2025-03-05 RX ORDER — POLYETHYLENE GLYCOL 3350 17 G/17G
17 POWDER, FOR SOLUTION ORAL DAILY PRN
Status: DISCONTINUED | OUTPATIENT
Start: 2025-03-05 | End: 2025-03-06 | Stop reason: HOSPADM

## 2025-03-05 RX ORDER — POTASSIUM CHLORIDE 7.45 MG/ML
10 INJECTION INTRAVENOUS PRN
Status: DISCONTINUED | OUTPATIENT
Start: 2025-03-05 | End: 2025-03-06 | Stop reason: HOSPADM

## 2025-03-05 RX ORDER — ACETAMINOPHEN 325 MG/1
650 TABLET ORAL EVERY 6 HOURS PRN
Status: DISCONTINUED | OUTPATIENT
Start: 2025-03-05 | End: 2025-03-06 | Stop reason: HOSPADM

## 2025-03-05 RX ADMIN — KETOROLAC TROMETHAMINE 15 MG: 30 INJECTION, SOLUTION INTRAMUSCULAR at 10:00

## 2025-03-05 RX ADMIN — IOPAMIDOL 75 ML: 755 INJECTION, SOLUTION INTRAVENOUS at 09:02

## 2025-03-05 RX ADMIN — ACETAMINOPHEN 650 MG: 325 TABLET ORAL at 18:26

## 2025-03-05 ASSESSMENT — PAIN DESCRIPTION - ORIENTATION
ORIENTATION: RIGHT

## 2025-03-05 ASSESSMENT — PAIN DESCRIPTION - LOCATION
LOCATION: CHEST
LOCATION: ARM
LOCATION: ARM
LOCATION: CHEST
LOCATION: ARM

## 2025-03-05 ASSESSMENT — ENCOUNTER SYMPTOMS
CHEST TIGHTNESS: 0
VOMITING: 0
ABDOMINAL PAIN: 0
SORE THROAT: 0
SINUS PRESSURE: 0
CONSTIPATION: 0
NAUSEA: 0
SHORTNESS OF BREATH: 0
VOICE CHANGE: 0
WHEEZING: 0
COUGH: 0
DIARRHEA: 0
BACK PAIN: 0
COLOR CHANGE: 0
TROUBLE SWALLOWING: 0
SINUS PAIN: 0

## 2025-03-05 ASSESSMENT — PAIN SCALES - GENERAL
PAINLEVEL_OUTOF10: 5
PAINLEVEL_OUTOF10: 9
PAINLEVEL_OUTOF10: 8
PAINLEVEL_OUTOF10: 5
PAINLEVEL_OUTOF10: 7
PAINLEVEL_OUTOF10: 9

## 2025-03-05 ASSESSMENT — PAIN DESCRIPTION - FREQUENCY: FREQUENCY: INTERMITTENT

## 2025-03-05 ASSESSMENT — LIFESTYLE VARIABLES
HOW OFTEN DO YOU HAVE A DRINK CONTAINING ALCOHOL: NEVER
HOW MANY STANDARD DRINKS CONTAINING ALCOHOL DO YOU HAVE ON A TYPICAL DAY: PATIENT DOES NOT DRINK

## 2025-03-05 ASSESSMENT — PAIN DESCRIPTION - ONSET: ONSET: ON-GOING

## 2025-03-05 ASSESSMENT — PAIN DESCRIPTION - DESCRIPTORS: DESCRIPTORS: THROBBING

## 2025-03-05 ASSESSMENT — PAIN - FUNCTIONAL ASSESSMENT: PAIN_FUNCTIONAL_ASSESSMENT: ACTIVITIES ARE NOT PREVENTED

## 2025-03-05 ASSESSMENT — PAIN DESCRIPTION - PAIN TYPE: TYPE: ACUTE PAIN

## 2025-03-05 NOTE — ED NOTES
Patient Name: Alex Gonzalez  : 1975 49 y.o.  MRN: [id number]  ED Room #: A04/A04-04     Chief complaint:   Chief Complaint   Patient presents with    Fall     Pt states he has had an unsteady gait intermittently for the last week or so, states this morning he fell and injured his R upper body.      Hospital Problem/Diagnosis:   Hospital Problems             Last Modified POA    * (Principal) Phenytoin toxicity, accidental or unintentional, initial encounter 3/5/2025 Yes         O2 Flow Rate:O2 Device: None (Room air)   (if applicable)  Cardiac Rhythm:   (if applicable)  Active LDA's:   Peripheral IV 25 Left Antecubital (Active)   Site Assessment Clean, dry & intact 25   Line Status Flushed 25            How does patient ambulate? Stand by assist    2. How does patient take pills? Whole with Water    3. Is patient alert? Alert    4. Is patient oriented? To Person, To Place, To Time, To Situation, and Follows Commands    5.   Patient arrived from:  home  Facility Name: ___________________________________________    6. If patient is disoriented or from a Sk[facility] Nursing Facility has family been notified of admission? No    7. Patient belongings? Belongings: Clothing    Disposition of belongings? Kept with Patient     8. Any specific patient or family belongings/needs/dynamics?   a. N/a    9. Miscellaneous comments/pending orders?  a. N/a      If there are any additional questions please reach out to the Emergency Department.      Bernardo Alfaro RN  [phone]

## 2025-03-05 NOTE — ED NOTES
MRI screening form complete with pt. This RN called MRI to notify them that screening form has been completed.      Bernardo Alfaro, RN  03/05/25 1013

## 2025-03-05 NOTE — ED PROVIDER NOTES
[facility]  EMERGENCY DEPARTMENT ENCOUNTER          ATTENDING PHYSICIAN NOTE       Date of evaluation: 3/5/2025    Chief Complaint     Fall (Pt states he has had an unsteady gait intermittently for the last week or so, states this morning he fell and injured his R upper body. )      History of Present Illness     Alex Gonzalez is a 49 y.o. male  with epilepsy who presents with unsteady gait and dizziness. He has been experiencing unsteady gait intermittently over the past week, with a significant worsening today. The current episode is much more severe than previous ones, occurring even during simple walking. No headaches, weakness, double vision, or blurry vision. He feels cold but denies fevers or chills. He fell this morning, hitting his right arm and chest against a cabinet, but did not bump his head. There is no facial droop, weakness, or visual disturbances following the fall. He has a history of epilepsy but denies any recent seizures. He recently quit smoking after having smoked since he was [age].    ASSESSMENT / PLAN  (MEDICAL DECISION MAKING)     INITIAL VITALS: BP: (!) 142/89, Temp: 98 °F (36.7 °C), Pulse: 76, Respirations: 16, SpO2: 99 %        Medical Decision Making  Problems Addressed:  Accidental phenytoin poisoning, initial encounter: complicated acute illness or injury with systemic symptoms that poses a threat to life or bodily functions    Amount and/or Complexity of Data Reviewed  Labs: ordered. Decision-making details documented in ED Course.  Radiology: ordered. Decision-making details documented in ED Course.  ECG/medicine tests: ordered.    Risk  Prescription drug management.  Decision regarding hospitalization.        Critical Care:  Due to the immediate potential for life-threatening deterioration due to acute phenytoin toxicity, I spent 35 minutes providing critical care.  This time excludes time spent performing procedures but includes time spent on direct patient care,

## 2025-03-05 NOTE — H&P
V2.0  History and Physical      Name:  Alex Gonzalez /Age/Sex: 1975  (49 y.o. male)   MRN & CSN:  [id number] & [id number] Encounter Date/Time: 3/5/2025 2:55 PM EST   Location:  [facility]/A04- PCP: Solo Ragland MD       Hospital Day: 1    History from:     patient    History of Present Illness:     Chief Complaint: Phenytoin toxicity, accidental or unintentional, initial encounter    Alex Gonzalez is a 49 y.o. male with pmh of seizure history and hyperlipidemia who presents with a week of gait instability as well as a fall today.  Patient had a stroke workup in the ER which included CT, CTA, and MRI did not reveal any acute infarct.  His laboratory work did come back with an elevated phenytoin level greater than 40.  Patient has been on this medication for decades.  Does not currently follow with neurology.  Last seizure was in 2018.  His last level that I can find was also elevated on .  At that time it was attributed to taking his medication in the evening prior to having his fasting blood work done.  Level was not rechecked at that time.  ED discussed the case with poison control who recommended holding his medication and checking every 6 phenytoin levels until normal.  Patient last took his medication at approximately 5 AM on 3/5/2025    Assessment and Plan:   Alex Gonzalez is a 49 y.o. male  who presents with Phenytoin toxicity, accidental or unintentional, initial encounter    Phenytoin toxicity  Seizure history  Patient last seizure was back in 2018 and he is compliant with his phenytoin.  Patient did have a level checked in  that was elevated to 25.  At this time it was attributed to patient taking his medication the evening before having his blood checked.  Level was not rechecked at this time.  Now it is greater than 40.  Neurology consulted  Hold phenytoin  Per poison control: Every 6 phenytoin levels until normal  Telemetry    Hyperlipidemia  Continue statin    Disposition:   Current  sequelae of an infectious/inflammatory etiology. Electronically signed by Murali Fairbanks    CTA HEAD NECK W CONTRAST    Result Date: 3/5/2025  CTA HEAD NECK W CONTRAST, CT HEAD WO CONTRAST Indication: gait instability Comparison: 12/23/2021 Technique:  Non-contrast axial CT of the brain. Head and neck CTA was performed with IV contrast. Multiplanar MIP reconstructions were created. 75 mL of Isovue-300 IV. Quantitative stenosis measurements were calculated on the basis of diameter stenosis, per NASCET criteria. 3-dimensional vascular reconstructions were also provided for review. Findings:   CT HEAD: No acute intracranial hemorrhage, extra-axial fluid collection, mass effect, or shift. No hydrocephalus. Parenchymal gray-white differentiation is preserved. Visualized paranasal sinuses and mastoid air cells are clear. Calvarium is intact. CTA NECK: There is a 3 vessel aortic arch. The origins of the great vessels are patent without hemodynamically significant stenosis. Common carotid arteries are patent and normal in caliber. Right Internal Carotid Artery: The carotid bulb is unremarkable. Mild stenosis of the right internal carotid artery at its origin. Left Internal Carotid Artery: The carotid bulb is unremarkable. No significant stenosis or occlusion. No intimal flap. The vertebral arteries arise from the subclavian arteries. There is no hemodynamically significant stenosis or occlusion of the vertebral arteries. Lung apices demonstrate respiratory motion artifact. Minimal hazy groundglass attenuation of the right upper lobe posteriorly is nonspecific. There are multilevel degenerative changes of the cervical spine. CTA HEAD: The high cervical, petrous, cavernous, and supraclinoid internal carotid arteries have a normal course and caliber. An anterior communicating artery is visualized. The proximal JESE and MCA branches are patent. There is a right posterior communicating artery and a left posterior communicating

## 2025-03-05 NOTE — ED NOTES
Pt is refusing LR infusion as well as lovenox shot. This RN educated pt on importance of fluid infusion and DVT prophylaxis. Pt continues to refuse and states, \"the doctor told me I wouldn't need any of that stuff.\"      Bernardo Alfaro, RN  03/05/25 [id number]
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
## 2025-03-06 VITALS
TEMPERATURE: 97.6 F | RESPIRATION RATE: 20 BRPM | HEIGHT: 64 IN | SYSTOLIC BLOOD PRESSURE: 129 MMHG | OXYGEN SATURATION: 97 % | BODY MASS INDEX: 25.54 KG/M2 | DIASTOLIC BLOOD PRESSURE: 72 MMHG | WEIGHT: 149.6 LBS | HEART RATE: 93 BPM

## 2025-03-06 LAB
ALBUMIN SERPL-MCNC: 4.2 G/DL (ref 3.4–5)
ALBUMIN/GLOB SERPL: 1.5 {RATIO} (ref 1.1–2.2)
ALP SERPL-CCNC: 93 U/L (ref 40–129)
ALT SERPL-CCNC: 17 U/L (ref 10–40)
ANION GAP SERPL CALCULATED.3IONS-SCNC: 9 MMOL/L (ref 3–16)
AST SERPL-CCNC: 23 U/L (ref 15–37)
BASOPHILS # BLD: 0 K/UL (ref 0–0.2)
BASOPHILS NFR BLD: 0.1 %
BILIRUB SERPL-MCNC: <0.2 MG/DL (ref 0–1)
BUN SERPL-MCNC: 18 MG/DL (ref 7–20)
CALCIUM SERPL-MCNC: 9 MG/DL (ref 8.3–10.6)
CHLORIDE SERPL-SCNC: 103 MMOL/L (ref 99–110)
CO2 SERPL-SCNC: 25 MMOL/L (ref 21–32)
CREAT SERPL-MCNC: 0.8 MG/DL (ref 0.9–1.3)
DEPRECATED RDW RBC AUTO: 14.8 % (ref 12.4–15.4)
EOSINOPHIL # BLD: 0.1 K/UL (ref 0–0.6)
EOSINOPHIL NFR BLD: 1.2 %
GFR SERPLBLD CREATININE-BSD FMLA CKD-EPI: >90 ML/MIN/{1.73_M2}
GLUCOSE SERPL-MCNC: 96 MG/DL (ref 70–99)
HCT VFR BLD AUTO: 40.8 % (ref 40.5–52.5)
HGB BLD-MCNC: 13.8 G/DL (ref 13.5–17.5)
LYMPHOCYTES # BLD: 0.6 K/UL (ref 1–5.1)
LYMPHOCYTES NFR BLD: 8.4 %
MCH RBC QN AUTO: 31.6 PG (ref 26–34)
MCHC RBC AUTO-ENTMCNC: 33.9 G/DL (ref 31–36)
MCV RBC AUTO: 93.2 FL (ref 80–100)
MONOCYTES # BLD: 0.8 K/UL (ref 0–1.3)
MONOCYTES NFR BLD: 11.8 %
NEUTROPHILS # BLD: 5.7 K/UL (ref 1.7–7.7)
NEUTROPHILS NFR BLD: 78.5 %
PHENYTOIN DOSE: ABNORMAL MG
PHENYTOIN DOSE: NORMAL MG
PHENYTOIN DOSE: NORMAL MG
PHENYTOIN SERPL-MCNC: 35.2 UG/ML (ref 10–20)
PHENYTOIN SERPL-MCNC: 38.2 UG/ML (ref 10–20)
PHENYTOIN SERPL-MCNC: 38.5 UG/ML (ref 10–20)
PLATELET # BLD AUTO: 198 K/UL (ref 135–450)
PMV BLD AUTO: 7.5 FL (ref 5–10.5)
POTASSIUM SERPL-SCNC: 4 MMOL/L (ref 3.5–5.1)
PROT SERPL-MCNC: 7 G/DL (ref 6.4–8.2)
RBC # BLD AUTO: 4.38 M/UL (ref 4.2–5.9)
SODIUM SERPL-SCNC: 137 MMOL/L (ref 136–145)
WBC # BLD AUTO: 7.2 K/UL (ref 4–11)

## 2025-03-06 PROCEDURE — 80185 ASSAY OF PHENYTOIN TOTAL: CPT

## 2025-03-06 PROCEDURE — 2500000003 HC RX 250 WO HCPCS: Performed by: STUDENT IN AN ORGANIZED HEALTH CARE EDUCATION/TRAINING PROGRAM

## 2025-03-06 PROCEDURE — 85025 COMPLETE CBC W/AUTO DIFF WBC: CPT

## 2025-03-06 PROCEDURE — 6370000000 HC RX 637 (ALT 250 FOR IP): Performed by: STUDENT IN AN ORGANIZED HEALTH CARE EDUCATION/TRAINING PROGRAM

## 2025-03-06 PROCEDURE — 36415 COLL VENOUS BLD VENIPUNCTURE: CPT

## 2025-03-06 PROCEDURE — 80053 COMPREHEN METABOLIC PANEL: CPT

## 2025-03-06 PROCEDURE — 99233 SBSQ HOSP IP/OBS HIGH 50: CPT | Performed by: PSYCHIATRY & NEUROLOGY

## 2025-03-06 RX ORDER — PHENYTOIN SODIUM 100 MG/1
CAPSULE, EXTENDED RELEASE ORAL
Qty: 180 CAPSULE | Refills: 5 | Status: SHIPPED | OUTPATIENT
Start: 2025-03-06

## 2025-03-06 RX ADMIN — SODIUM CHLORIDE, PRESERVATIVE FREE 10 ML: 5 INJECTION INTRAVENOUS at 08:15

## 2025-03-06 RX ADMIN — ACETAMINOPHEN 650 MG: 325 TABLET ORAL at 08:18

## 2025-03-06 ASSESSMENT — PAIN SCALES - GENERAL
PAINLEVEL_OUTOF10: 0
PAINLEVEL_OUTOF10: 0
PAINLEVEL_OUTOF10: 3
PAINLEVEL_OUTOF10: 0

## 2025-03-06 ASSESSMENT — PAIN DESCRIPTION - PAIN TYPE: TYPE: ACUTE PAIN

## 2025-03-06 ASSESSMENT — PAIN DESCRIPTION - LOCATION: LOCATION: CHEST;ARM

## 2025-03-06 ASSESSMENT — PAIN DESCRIPTION - DESCRIPTORS: DESCRIPTORS: ACHING

## 2025-03-06 ASSESSMENT — PAIN DESCRIPTION - ORIENTATION: ORIENTATION: RIGHT

## 2025-03-06 ASSESSMENT — PAIN DESCRIPTION - ONSET: ONSET: ON-GOING

## 2025-03-06 ASSESSMENT — PAIN - FUNCTIONAL ASSESSMENT: PAIN_FUNCTIONAL_ASSESSMENT: ACTIVITIES ARE NOT PREVENTED

## 2025-03-06 ASSESSMENT — PAIN DESCRIPTION - FREQUENCY: FREQUENCY: INTERMITTENT

## 2025-03-06 NOTE — PLAN OF CARE
Problem: Discharge Planning  Goal: Discharge to home or other facility with appropriate resources  3/6/2025 0927 by Aisha Aj, RN  Outcome: Progressing  Flowsheets (Taken 3/6/2025 0808)  Discharge to home or other facility with appropriate resources: Identify barriers to discharge with patient and caregiver.  -Pt is from home with family. Case management following for discharge planning.     Problem: Pain  Goal: Verbalizes/displays adequate comfort level or baseline comfort level  3/6/2025 0927 by Aisha Aj, RN  Outcome: Progressing  Flowsheets (Taken 3/6/2025 0808)  Verbalizes/displays adequate comfort level or baseline comfort level: Encourage patient to monitor pain and request assistance.  -Pt reports soreness in R chest/arm. PRN Tylenol given as ordered. Will monitor pain level.     Problem: Neurosensory - Adult  Goal: Achieves stable or improved neurological status  Outcome: Progressing  Flowsheets (Taken 3/6/2025 0927)  Achieves stable or improved neurological status: Assess for and report changes in neurological status.  -Pt alert and oriented X4. Phenytoin level=38.2 this morning at 0553 and is being monitored q6h. Will continue to monitor neuro status for changes.

## 2025-03-06 NOTE — PLAN OF CARE
Problem: Discharge Planning  Goal: Discharge to home or other facility with appropriate resources  Outcome: Progressing  Flowsheets (Taken 3/5/2025 2109)  Discharge to home or other facility with appropriate resources:   Identify barriers to discharge with patient and caregiver   Identify discharge learning needs (meds, wound care, etc)   Refer to discharge planning if patient needs post-hospital services based on physician order or complex needs related to functional status, cognitive ability or social support system   Arrange for needed discharge resources and transportation as appropriate   Arrange for interpreters to assist at discharge as needed     Problem: Pain  Goal: Verbalizes/displays adequate comfort level or baseline comfort level  Outcome: Progressing  Flowsheets (Taken 3/5/2025 2109)  Verbalizes/displays adequate comfort level or baseline comfort level:   Encourage patient to monitor pain and request assistance   Administer analgesics based on type and severity of pain and evaluate response   Consider cultural and social influences on pain and pain management     Problem: Safety - Adult  Goal: Free from fall injury  Outcome: Progressing  Flowsheets (Taken 3/5/2025 2109)  Free From Fall Injury:   Instruct family/caregiver on patient safety   Based on caregiver fall risk screen, instruct family/caregiver to ask for assistance with transferring infant if caregiver noted to have fall risk factors     Problem: ABCDS Injury Assessment  Goal: Absence of physical injury  Outcome: Progressing

## 2025-03-06 NOTE — PROGRESS NOTES
Collection Time: 03/05/25 11:45 PM   Result Value Ref Range    Phenytoin Dose Amount Unknown    Phenytoin Level, Total    Collection Time: 03/06/25  5:45 AM   Result Value Ref Range    Phenytoin Dose Amount Unknown    Phenytoin Level, Total    Collection Time: 03/06/25  5:53 AM   Result Value Ref Range    Phenytoin Lvl 38.2 (HH) 10.0 - 20.0 ug/mL    Phenytoin Dose Amount Unknown    CBC with Auto Differential    Collection Time: 03/06/25  5:53 AM   Result Value Ref Range    WBC 7.2 4.0 - 11.0 K/uL    RBC 4.38 4.20 - 5.90 M/uL    Hemoglobin 13.8 13.5 - 17.5 g/dL    Hematocrit 40.8 40.5 - 52.5 %    MCV 93.2 80.0 - 100.0 fL    MCH 31.6 26.0 - 34.0 pg    MCHC 33.9 31.0 - 36.0 g/dL    RDW 14.8 12.4 - 15.4 %    Platelets 198 135 - 450 K/uL    MPV 7.5 5.0 - 10.5 fL    Neutrophils % 78.5 %    Lymphocytes % 8.4 %    Monocytes % 11.8 %    Eosinophils % 1.2 %    Basophils % 0.1 %    Neutrophils Absolute 5.7 1.7 - 7.7 K/uL    Lymphocytes Absolute 0.6 (L) 1.0 - 5.1 K/uL    Monocytes Absolute 0.8 0.0 - 1.3 K/uL    Eosinophils Absolute 0.1 0.0 - 0.6 K/uL    Basophils Absolute 0.0 0.0 - 0.2 K/uL   Comprehensive Metabolic Panel w/ Reflex to MG    Collection Time: 03/06/25  5:53 AM   Result Value Ref Range    Sodium 137 136 - 145 mmol/L    Potassium reflex Magnesium 4.0 3.5 - 5.1 mmol/L    Chloride 103 99 - 110 mmol/L    CO2 25 21 - 32 mmol/L    Anion Gap 9 3 - 16    Glucose 96 70 - 99 mg/dL    BUN 18 7 - 20 mg/dL    Creatinine 0.8 (L) 0.9 - 1.3 mg/dL    Est, Glom Filt Rate >90 >60    Calcium 9.0 8.3 - 10.6 mg/dL    Total Protein 7.0 6.4 - 8.2 g/dL    Albumin 4.2 3.4 - 5.0 g/dL    Albumin/Globulin Ratio 1.5 1.1 - 2.2    Total Bilirubin <0.2 0.0 - 1.0 mg/dL    Alkaline Phosphatase 93 40 - 129 U/L    ALT 17 10 - 40 U/L    AST 23 15 - 37 U/L       Scheduled Meds:   rosuvastatin  20 mg Oral Nightly    sodium chloride flush  5-40 mL IntraVENous 2 times per day    enoxaparin  40 mg SubCUTAneous Daily       Continuous Infusions:  sodium  chloride        PRN Meds:  sodium chloride flush, 5-40 mL, PRN  sodium chloride, , PRN  potassium chloride, 40 mEq, PRN   Or  potassium alternative oral replacement, 40 mEq, PRN   Or  potassium chloride, 10 mEq, PRN  magnesium sulfate, 2,000 mg, PRN  ondansetron, 4 mg, Q8H PRN   Or  ondansetron, 4 mg, Q6H PRN  polyethylene glycol, 17 g, Daily PRN  acetaminophen, 650 mg, Q6H PRN   Or  acetaminophen, 650 mg, Q6H PRN                Discussed at length with patient; nursing; and Dr. Zane Carrizales MD  Neurology/Neurocritical Care  March 6, 2025

## 2025-03-06 NOTE — CONSULTS
Clinical Pharmacy Progress Note  Medication History     Admit Date: 3/5/2025    List of current medications patient is taking is complete. Home Medication list in Epic updated to reflect changes noted below.    Source of information: Rx dispense history; interview with patient    Changes made to medication list:   Medication doses adjusted:   Flonase - takes PRN  Naproxen - takes PRN  Other notes:   Pt has not started taking Rosuvastatin - he has a prescription but has not filled   Patient reports last dose phenytoin was on 3/5 at ~5am -- initial level drawn on 3/5 at 11:53 (~6h after AM dose) - may consider this more of a peak level (vs trough)    Current Outpatient Medications   Medication Instructions    fluticasone (FLONASE) 50 MCG/ACT nasal spray 2 sprays, Each Nostril, DAILY    naproxen (NAPROSYN) 500 mg, Oral, 2 TIMES DAILY WITH MEALS    phenytoin (DILANTIN) 100 MG ER capsule TAKE 6 CAPSULES BY MOUTH EVERY DAY    rosuvastatin (CRESTOR) 20 mg, Oral, NIGHTLY    sildenafil (VIAGRA) 50 mg, Oral, DAILY PRN, OK to substitute for generic brand if Viagara too expensive     Please call with questions--  Jenn Garay, [place], [facility]  Wireless: [phone]   3/6/2025 10:37 AM

## 2025-03-06 NOTE — CARE COORDINATION
Discharge Planning:    CM noted DC order- chart reviewed, pt from home and IPTA, remains at baseline mobility. No CM/SW consults noted. No CM needs identified prior to DC.    Thank you  Cat Kayden GUILLORY, BSN, CM  PCU   [phone]

## 2025-03-06 NOTE — PROGRESS NOTES
Clinical Pharmacy Progress Note  Patient Education     While reviewing home medication list with patient, discussed phenytoin monitoring with patient. Explained timing of lab draws for phenytoin levels (trough levels are idea). Also explained things that can effect phenytoin levels (renal function, drug interactions).    Patient education confirmed via teach-back.     Please call with questions--  Jenn Garay PharmD, [facility]  Wireless: [phone]   3/6/2025 10:41 AM

## 2025-03-06 NOTE — PLAN OF CARE
Pt was insisting to get a phenytoin this AM even it was on hold.Tried to explain that levels has been high the reason why he is in here and its been monitored.Pt became more argumentative when being explained.Notified Dr Alfaro to come by and see pt.Charge RN made aware.

## 2025-03-06 NOTE — DISCHARGE INSTRUCTIONS
Seizures and high phenytoin level  Start taking phenytoin tablets 200 mg in the morning and 300 mg at night starting tomorrow.  Get phenytoin level on 3/10 and follow-up with PCP

## 2025-03-06 NOTE — PROGRESS NOTES
4 Eyes Skin Assessment     NAME:  Alex Gonzalez  YOB: 1975  MEDICAL RECORD NUMBER:  [id number]    The patient is being assessed for  Admission    I agree that at least one RN has performed a thorough Head to Toe Skin Assessment on the patient. ALL assessment sites listed below have been assessed.      Areas assessed by both nurses:    Head, Face, Ears, Shoulders, Back, Chest, Arms, Elbows, Hands, Sacrum. Buttock, Coccyx, Ischium, Legs. Feet and Heels, and Under Medical Devices         Does the Patient have a Wound? No noted wound(s)    Bruise R arm       Bobo Prevention initiated by RN: No  Wound Care Orders initiated by RN: No    Pressure Injury (Stage 3,4, Unstageable, DTI, NWPT, and Complex wounds) if present, place Wound referral order by RN under : No    New Ostomies, if present place, Ostomy referral order under : No     Nurse 1 eSignature: Electronically signed by Darya Sewell RN on 3/5/25 at 9:29 PM EST    **SHARE this note so that the co-signing nurse can place an eSignature**    Nurse 2 eSignature: Electronically signed by Jhonny Bird RN on 3/5/25 at 9:33 PM EST

## 2025-03-06 NOTE — PROGRESS NOTES
Discharge note: Patient has been seen by doctor. Discharge order obtained, and discharge instructions reviewed. Patient educated, using the teach back method, about follow up instructions and discharge instructions. A completed copy of the AVS instructions given to patient and all questions answered. IV catheter removed without complaints, catheter intact, site WNL. Tele removed. Pt ambulatory at discharge and will be driving self home (Dr. Degroot approved pt to drive self home). Electronically signed by Aisha Aj RN on 3/6/2025 at 5:54 PM

## 2025-03-06 NOTE — DISCHARGE INSTR - COC
Continuity of Care Form    Patient Name: Alex Gonzalez   :  1975  MRN:  [id number]    Admit date:  3/5/2025  Discharge date:  3/6/25

## 2025-03-10 NOTE — DISCHARGE SUMMARY
V2.0  Discharge Summary    Name:  Alex Gonzalez /Age/Sex: 1975 (49 y.o. male)   Admit Date: 3/5/2025  Discharge Date: 3/6/25    MRN & CSN:  [id number] & [id number] Encounter Date and Time 3/6/25 10:07 PM EDT    Attending:  No att. providers found Discharging Provider: Liya Degroot MD       Hospital Course:     Brief HPI: Alex Gonzalez is a 49 y.o. male with pmh of seizure history and hyperlipidemia who presents with a week of gait instability as well as a fall today. Patient had a stroke workup in the ER which included CT, CTA, and MRI did not reveal any acute infarct. His laboratory work did come back with an elevated phenytoin level greater than 40. Patient has been on this medication for decades. Does not currently follow with neurology. Last seizure was in 2018. His last level that I can find was also elevated on . At that time it was attributed to taking his medication in the evening prior to having his fasting blood work done. Level was not rechecked at that time. ED discussed the case with poison control who recommended holding his medication and checking every 6 phenytoin levels until normal. Patient last took his medication at approximately 5 AM on 3/5/2025     Brief Problem Based Course:   Phenytoin toxicity  Seizure history  Patient last seizure was back in 2018 and he is compliant with his phenytoin.  Patient did have a level checked in  that was elevated to 25.  At this time it was attributed to patient taking his medication the evening before having his blood checked.  Level was not rechecked at this time.  Now it is greater than 40.  Neurology consulted, last phenytoin 35. Recommend 200 md in am and 300 mg at night(decreasing total dose from 600 to 500) starting 3/7 and repeat phenytoin level in 1 week.   Recommend OP neuro follow-up for further management      The patient expressed appropriate understanding of, and agreement with the discharge recommendations,

## 2025-04-24 ENCOUNTER — OFFICE VISIT (OUTPATIENT)
Dept: INTERNAL MEDICINE CLINIC | Age: 50
End: 2025-04-24
Payer: COMMERCIAL

## 2025-04-24 VITALS
WEIGHT: 142 LBS | HEART RATE: 84 BPM | TEMPERATURE: 97.2 F | RESPIRATION RATE: 16 BRPM | DIASTOLIC BLOOD PRESSURE: 80 MMHG | OXYGEN SATURATION: 98 % | BODY MASS INDEX: 24.37 KG/M2 | SYSTOLIC BLOOD PRESSURE: 121 MMHG

## 2025-04-24 DIAGNOSIS — M54.50 CHRONIC BILATERAL LOW BACK PAIN WITHOUT SCIATICA: ICD-10-CM

## 2025-04-24 DIAGNOSIS — F17.200 NICOTINE DEPENDENCE WITH CURRENT USE: ICD-10-CM

## 2025-04-24 DIAGNOSIS — N52.8 OTHER MALE ERECTILE DYSFUNCTION: ICD-10-CM

## 2025-04-24 DIAGNOSIS — G89.29 CHRONIC BILATERAL LOW BACK PAIN WITHOUT SCIATICA: ICD-10-CM

## 2025-04-24 DIAGNOSIS — R56.9 SEIZURE (HCC): Primary | ICD-10-CM

## 2025-04-24 DIAGNOSIS — E78.2 MIXED HYPERLIPIDEMIA: ICD-10-CM

## 2025-04-24 PROCEDURE — 99213 OFFICE O/P EST LOW 20 MIN: CPT

## 2025-04-24 RX ORDER — NAPROXEN 500 MG/1
500 TABLET ORAL EVERY 8 HOURS PRN
Qty: 60 TABLET | Refills: 1 | Status: SHIPPED | OUTPATIENT
Start: 2025-04-24

## 2025-04-24 RX ORDER — SILDENAFIL 50 MG/1
50 TABLET, FILM COATED ORAL DAILY PRN
Qty: 30 TABLET | Refills: 1 | Status: SHIPPED | OUTPATIENT
Start: 2025-04-24 | End: 2025-06-23

## 2025-04-24 RX ORDER — ROSUVASTATIN CALCIUM 20 MG/1
20 TABLET, COATED ORAL NIGHTLY
Qty: 90 TABLET | Refills: 2 | Status: SHIPPED | OUTPATIENT
Start: 2025-04-24

## 2025-04-24 NOTE — ASSESSMENT & PLAN NOTE
Patient quit smoking cigarettes in November 2024.  He replaced that with vaping, 5% nicotine.    Smoking tobacco can increase enzymes that metabolize phenytoin, so if patient resumes smoking again, his phenytoin levels may need to be checked to see if they have dropped due to increased metabolism.    Reference: https://doi.org/10.1016/j.toxrep.2022.03.050  Patient had elevated phenytoin levels in March 2025 and the only thing that has changed was quitting smoking a few months earlier.

## 2025-04-24 NOTE — PROGRESS NOTES
[facility]    Alex Gonzalez is a 49 y.o. male, here for evaluation of the following concerns described below:    HPI  Patient was admitted to [facility] 3/5 - 3/6/2025 due to a fall.  Patient reports feeling like he was drunk but says he does not drink any alcohol ever.  At the hospital, they found his phenytoin levels to be elevated to > 40.0.  He also underwent a full stroke workup and a CT head, CTA head and neck, and MRI brain were all negative for acute infarct.  Neurology recommended patient adjust his phenytoin dosage to 200 mg in the morning and 300 mg in the evening (patient reports currently taking 300 mg in the morning and 200 mg in the evening).  Previously, he was prescribed 600 mg daily.  He denies any seizures or seizure-like activity and says the last time he had 1 was well over a decade ago.  Patient was supposed to get his phenytoin levels rechecked shortly after discharge from the hospital but he has not gotten that done yet because he has been very busy.  Patient denies starting any new medication or taking vitamins or over-the-counter supplements.  He did however stop smoking curettes in November.    Patient quit smoking cigarettes in November.  He reports he is currently vaping 5% nicotine.  Patient reports using marijuana maybe once a year and sometimes not even that much.  He denies any other substance use and says he never drinks alcohol.    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  He would like a refill for his naproxen today.    Review of Systems - A 10 point review of systems was conducted and significant findings noted in HPI.    MEDICATIONS:  Prior to Visit Medications    Medication Sig Taking? Authorizing Provider   phenytoin (DILANTIN) 100 MG ER capsule Take 2 capsules by mouth every morning AND 3 capsules nightly. TAKE 6 CAPSULES BY MOUTH EVERY DAY. Yes Liya Degroot,

## 2025-04-24 NOTE — PATIENT INSTRUCTIONS
Please get your phenytonin levels checked a few days before your next visit with us.    Good job on staying away from the cigarettes!  Keep up the good work!

## 2025-04-28 RX ORDER — TADALAFIL 10 MG/1
10 TABLET ORAL DAILY PRN
Qty: 30 TABLET | Refills: 1 | Status: SHIPPED | OUTPATIENT
Start: 2025-04-28

## 2025-05-29 DIAGNOSIS — N52.8 OTHER MALE ERECTILE DYSFUNCTION: Primary | ICD-10-CM

## 2025-05-29 RX ORDER — TADALAFIL 10 MG/1
10 TABLET ORAL DAILY PRN
Qty: 30 TABLET | Refills: 1 | Status: SHIPPED | OUTPATIENT
Start: 2025-05-29

## 2025-06-26 ENCOUNTER — TELEPHONE (OUTPATIENT)
Dept: INTERNAL MEDICINE CLINIC | Age: 50
End: 2025-06-26